# Patient Record
Sex: MALE | Race: WHITE | NOT HISPANIC OR LATINO | Employment: FULL TIME | URBAN - METROPOLITAN AREA
[De-identification: names, ages, dates, MRNs, and addresses within clinical notes are randomized per-mention and may not be internally consistent; named-entity substitution may affect disease eponyms.]

---

## 2017-08-11 ENCOUNTER — DOCUMENTATION ONLY (OUTPATIENT)
Dept: OTHER | Facility: CLINIC | Age: 57
End: 2017-08-11

## 2017-08-11 ENCOUNTER — OFFICE VISIT (OUTPATIENT)
Dept: FAMILY MEDICINE | Facility: CLINIC | Age: 57
End: 2017-08-11
Payer: COMMERCIAL

## 2017-08-11 VITALS
DIASTOLIC BLOOD PRESSURE: 68 MMHG | BODY MASS INDEX: 24.71 KG/M2 | HEART RATE: 80 BPM | OXYGEN SATURATION: 96 % | SYSTOLIC BLOOD PRESSURE: 96 MMHG | HEIGHT: 68 IN | TEMPERATURE: 97.3 F | WEIGHT: 163 LBS

## 2017-08-11 DIAGNOSIS — I83.90 VARICOSE VEIN OF LEG: ICD-10-CM

## 2017-08-11 DIAGNOSIS — Z80.42 FAMILY HX OF PROSTATE CANCER: ICD-10-CM

## 2017-08-11 DIAGNOSIS — N49.2 SCROTAL NODULE: ICD-10-CM

## 2017-08-11 DIAGNOSIS — Z11.59 NEED FOR HEPATITIS C SCREENING TEST: ICD-10-CM

## 2017-08-11 DIAGNOSIS — Z12.11 SPECIAL SCREENING FOR MALIGNANT NEOPLASMS, COLON: ICD-10-CM

## 2017-08-11 DIAGNOSIS — Z00.01 ENCOUNTER FOR ROUTINE ADULT HEALTH EXAMINATION WITH ABNORMAL FINDINGS: Primary | ICD-10-CM

## 2017-08-11 DIAGNOSIS — Z13.6 CARDIOVASCULAR SCREENING; LDL GOAL LESS THAN 160: ICD-10-CM

## 2017-08-11 PROCEDURE — 99396 PREV VISIT EST AGE 40-64: CPT | Performed by: PHYSICIAN ASSISTANT

## 2017-08-11 PROCEDURE — 86803 HEPATITIS C AB TEST: CPT | Performed by: PHYSICIAN ASSISTANT

## 2017-08-11 PROCEDURE — 36415 COLL VENOUS BLD VENIPUNCTURE: CPT | Performed by: PHYSICIAN ASSISTANT

## 2017-08-11 NOTE — MR AVS SNAPSHOT
After Visit Summary   8/11/2017    Duran Garner    MRN: [id number]           Patient Information     Date Of Birth          1960        Visit Information        Provider Department      8/11/2017 2:00 PM Usama Interiano [facility]C [facility]        Today's Diagnoses     Encounter for routine adult health examination with abnormal findings    -  1    CARDIOVASCULAR SCREENING; LDL GOAL LESS THAN 160        Family hx of prostate cancer        Varicose vein of leg        Special screening for malignant neoplasms, colon        Need for hepatitis C screening test        Scrotal nodule          Care Instructions      Preventive Health Recommendations  Male Ages 50 - 64    Yearly exam:             See your health care provider every year in order to  o   Review health changes.   o   Discuss preventive care.    o   Review your medicines if your doctor has prescribed any.     Have a cholesterol test every 5 years, or more frequently if you are at risk for high cholesterol/heart disease.     Have a diabetes test (fasting glucose) every three years. If you are at risk for diabetes, you should have this test more often.     Have a colonoscopy at age 50, or have a yearly FIT test (stool test). These exams will check for colon cancer.      Talk with your health care provider about whether or not a prostate cancer screening test (PSA) is right for you.    You should be tested each year for STDs (sexually transmitted diseases), if you re at risk.     Shots: Get a flu shot each year. Get a tetanus shot every 10 years.     Nutrition:    Eat at least 5 servings of fruits and vegetables daily.     Eat whole-grain bread, whole-wheat pasta and brown rice instead of white grains and rice.     Talk to your provider about Calcium and Vitamin D.     Lifestyle    Exercise for at least 150 minutes a week (30 minutes a day, 5 days a week). This will help you control your weight and prevent disease.      Limit alcohol to one drink per day.     No smoking.     Wear sunscreen to prevent skin cancer.     See your dentist every six months for an exam and cleaning.     See your eye doctor every 1 to 2 years.      You can call [phone] to schedule your imaging at [facility].             Follow-ups after your visit        Additional Services     VASCULAR REFERRAL       Your provider has referred you to the [facility].    Reason for Referral: varicose veins    Urgency of Referral: routine    Please be aware that coverage of these services is subject to the terms and limitations of your health insurance plan.  Call member services at your health plan with any benefit or coverage questions.      Please bring the following with you to your appointment:  (1) Any X-Rays, CTs or MRIs which have been performed.  Contact the facility where they were done to arrange for  prior to your scheduled appointment.  Any new CT, MRI or other procedures ordered by your specialist must be performed at a [place] facility or coordinated by your clinic's referral office.    (2) List of current medications   (3) This referral request   (4) Any documents/labs given to you for this referral                  Future tests that were ordered for you today     Open Future Orders        Priority Expected Expires Ordered    US Testicular and Scrotum Routine  8/11/2018 8/11/2017    Fecal colorectal cancer screen FIT Routine 9/1/2017 11/3/2017 8/11/2017            Who to contact     If you have questions or need follow up information about today's clinic visit or your schedule please contact [facility] directly at [phone].  Normal or non-critical lab and imaging results will be communicated to you by MyChart, letter or phone within 4 business days after the clinic has received the results. If you do not hear from us within 7 days, please contact the clinic through MyChart or phone. If you  "have a critical or abnormal lab result, we will notify you by phone as soon as possible.  Submit refill requests through [employer] or call your pharmacy and they will forward the refill request to us. Please allow 3 business days for your refill to be completed.          Additional Information About Your Visit        [employer] Information     [employer] gives you secure access to your electronic health record. If you see a primary care provider, you can also send messages to your care team and make appointments. If you have questions, please call your primary care clinic.  If you do not have a primary care provider, please call [phone] and they will assist you.        Care EveryWhere ID     This is your Care EveryWhere ID. This could be used by other organizations to access your [place] medical records  [id number]        Your Vitals Were     Pulse Temperature Height Pulse Oximetry BMI (Body Mass Index)       80 97.3  F (36.3  C) (Oral) 5' 8\" (1.727 m) 96% 24.78 kg/m2        Blood Pressure from Last 3 Encounters:   08/11/17 96/68   11/20/15 112/72   11/18/15 128/74    Weight from Last 3 Encounters:   08/11/17 163 lb (73.9 kg)   11/20/15 175 lb (79.4 kg)   11/18/15 175 lb (79.4 kg)              We Performed the Following     Hepatitis C Screen Reflex to HCV RNA Quant and Genotype     VASCULAR REFERRAL        Primary Care Provider Office Phone # Fax #    Usama Interiano PA-C [phone] [address] [facility] [address]        Equal Access to Services     [facility] SONNY : Hadii aad ku hadasho Soomaali, waaxda luqadaha, qaybta kaalmada adeegyada, guy henry . So [facility] [phone].    ATENCIÓN: Si habla español, tiene a albert disposición servicios gratuitos de asistencia lingüística. Llame al [phone].    We comply with applicable federal civil rights laws and [place] laws. We do not discriminate on the basis of race, color, national origin, age, disability " sex, sexual orientation or gender identity.            Thank you!     Thank you for choosing [facility]  for your care. Our goal is always to provide you with excellent care. Hearing back from our patients is one way we can continue to improve our services. Please take a few minutes to complete the written survey that you may receive in the mail after your visit with us. Thank you!             Your Updated Medication List - Protect others around you: Learn how to safely use, store and throw away your medicines at www.disposemymeds.org.          This list is accurate as of: 8/11/17  2:52 PM.  Always use your most recent med list.                   Brand Name Dispense Instructions for use Diagnosis    NO ACTIVE MEDICATIONS

## 2017-08-11 NOTE — PROGRESS NOTES
SUBJECTIVE:   CC: Duran Garner is an 57 year old male who presents for preventative health visit.     Physical   Annual:     Getting at least 3 servings of Calcium per day::  Yes    Bi-annual eye exam::  Yes    Dental care twice a year::  NO    Sleep apnea or symptoms of sleep apnea::  None    Diet::  Regular (no restrictions)    Frequency of exercise::  4-5 days/week    Duration of exercise::  Greater than 60 minutes    Taking medications regularly::  Yes    Medication side effects::  Not applicable    Additional concerns today::  YES        Veins      Duration: several years    Description (location/character/radiation): bilateral calves     Intensity:  moderate    Accompanying signs and symptoms: pain, bulge out     History (similar episodes/previous evaluation): None    Precipitating or alleviating factors: None    Therapies tried and outcome: None     Patient notes a chronic hx of varicose veins  They are irritating, especially after working out or time on feet  Seem to be worsening    Today's PHQ-2 Score: PHQ-2 ( 1999 Pf[employer]) 8/11/2017   Q1: Little interest or pleasure in doing things 0   Q2: Feeling down, depressed or hopeless 0   PHQ-2 Score 0   Q1: Little interest or pleasure in doing things Not at all   Q2: Feeling down, depressed or hopeless Not at all   PHQ-2 Score 0       Abuse: Current or Past(Physical, Sexual or Emotional)- No  Do you feel safe in your environment - Yes    Social History   Substance Use Topics     Smoking status: Never Smoker     Smokeless tobacco: Never Used     Alcohol use 0.0 - 0.5 oz/week     0 - 1 Standard drinks or equivalent per week     The patient does not drink >3 drinks per day nor >7 drinks per week.    Last PSA:   PSA   Date Value Ref Range Status   11/16/2015 0.49 0 - 4 ug/L Final       Reviewed orders with patient. Reviewed health maintenance and updated orders accordingly - Yes  Labs reviewed in EPIC    Reviewed and updated as needed this visit by clinical staff      "    Reviewed and updated as needed this visit by Provider              ROS:  C: NEGATIVE for fever, chills, change in weight  INTEGUMENTARY/SKIN: varicose veins  E: NEGATIVE for vision changes or irritation  ENT: NEGATIVE for ear, mouth and throat problems  R: NEGATIVE for significant cough or SOB  CV: NEGATIVE for chest pain, palpitations or peripheral edema  GI: NEGATIVE for nausea, abdominal pain, heartburn, or change in bowel habits   male: negative for dysuria, hematuria, decreased urinary stream, erectile dysfunction, urethral discharge  M: NEGATIVE for significant arthralgias or myalgia  N: NEGATIVE for weakness, dizziness or paresthesias  P: NEGATIVE for changes in mood or affect    OBJECTIVE:   BP 96/68 (BP Location: Right arm, Cuff Size: Adult Regular)  Pulse 80  Temp 97.3  F (36.3  C) (Oral)  Ht 5' 8\" (1.727 m)  Wt 163 lb (73.9 kg)  SpO2 96%  BMI 24.78 kg/m2    EXAM:  GENERAL: healthy, alert and no distress  EYES: Eyes grossly normal to inspection, PERRL and conjunctivae and sclerae normal  HENT: ear canals and TM's normal, nose and mouth without ulcers or lesions  RESP: lungs clear to auscultation - no rales, rhonchi or wheezes  CV: regular rate and rhythm, normal S1 S2, no S3 or S4, no murmur, click or rub, no peripheral edema and peripheral pulses strong  ABDOMEN: soft, nontender, no hepatosplenomegaly, no masses and bowel sounds normal   (male): testicular nodule along the left testicle, no hernias and penis normal without urethral discharge  MS: varicose veins bilaterally, large  PSYCH: mentation appears normal, affect normal/bright    ASSESSMENT/PLAN:   1. Encounter for routine adult health examination with abnormal findings  56yo male in stable health. Does [employer] triathlons. Report annual screenings thru work as a .     2. CARDIOVASCULAR SCREENING; LDL GOAL LESS THAN 160  Patient updates labs routinely thru work. Will drop these off    3. Family hx of prostate cancer  PSA in 2015 " "normal. No new symptoms. Monitor and consider recheck next year    4. Varicose vein of leg  - VASCULAR REFERRAL    5. Special screening for malignant neoplasms, colon  - Fecal colorectal cancer screen FIT; Future    6. Need for hepatitis C screening test  - Hepatitis C Screen Reflex to HCV RNA Quant and Genotype    7. Scrotal nodule  Reports this has been there for sometime. It is quite ttp.   - US Testicular and Scrotum; Future    COUNSELING:   Reviewed preventive health counseling, as reflected in patient instructions       Regular exercise       Healthy diet/nutrition       Colon cancer screening       Prostate cancer screening       reports that he has never smoked. He has never used smokeless tobacco.      Estimated body mass index is 24.78 kg/(m^2) as calculated from the following:    Height as of this encounter: 5' 8\" (1.727 m).    Weight as of this encounter: 163 lb (73.9 kg).       Counseling Resources:  ATP IV Guidelines  Pooled Cohorts Equation Calculator  FRAX Risk Assessment  ICSI Preventive Guidelines  Dietary Guidelines for Americans, 2010  USDA's MyPlate  ASA Prophylaxis  Lung CA Screening    Usama Interiano PA-C  [facility] JINNYMOUNTAnswers for HPI/ROS submitted by the patient on 8/11/2017   PHQ-2 Score: 0    "

## 2017-08-11 NOTE — PATIENT INSTRUCTIONS
Preventive Health Recommendations  Male Ages 50 - 64    Yearly exam:             See your health care provider every year in order to  o   Review health changes.   o   Discuss preventive care.    o   Review your medicines if your doctor has prescribed any.     Have a cholesterol test every 5 years, or more frequently if you are at risk for high cholesterol/heart disease.     Have a diabetes test (fasting glucose) every three years. If you are at risk for diabetes, you should have this test more often.     Have a colonoscopy at age 50, or have a yearly FIT test (stool test). These exams will check for colon cancer.      Talk with your health care provider about whether or not a prostate cancer screening test (PSA) is right for you.    You should be tested each year for STDs (sexually transmitted diseases), if you re at risk.     Shots: Get a flu shot each year. Get a tetanus shot every 10 years.     Nutrition:    Eat at least 5 servings of fruits and vegetables daily.     Eat whole-grain bread, whole-wheat pasta and brown rice instead of white grains and rice.     Talk to your provider about Calcium and Vitamin D.     Lifestyle    Exercise for at least 150 minutes a week (30 minutes a day, 5 days a week). This will help you control your weight and prevent disease.     Limit alcohol to one drink per day.     No smoking.     Wear sunscreen to prevent skin cancer.     See your dentist every six months for an exam and cleaning.     See your eye doctor every 1 to 2 years.      You can call [phone] to schedule your imaging at [facility].

## 2017-08-11 NOTE — PROGRESS NOTES
Received referral via In Basket. Per Vascular [facility]ce[facility] Service guidelines, forwarded to [facility].     Alysa Mckeon RN, BSN.

## 2017-08-14 LAB — HCV AB SERPL QL IA: NORMAL

## 2017-08-23 ENCOUNTER — HOSPITAL ENCOUNTER (OUTPATIENT)
Dept: ULTRASOUND IMAGING | Facility: CLINIC | Age: 57
Discharge: HOME | End: 2017-08-23
Attending: PHYSICIAN ASSISTANT | Admitting: PHYSICIAN ASSISTANT
Payer: COMMERCIAL

## 2017-08-23 DIAGNOSIS — N49.2 SCROTAL NODULE: ICD-10-CM

## 2017-08-23 PROCEDURE — 93976 VASCULAR STUDY: CPT

## 2017-09-15 ENCOUNTER — OFFICE VISIT (OUTPATIENT)
Dept: VASCULAR SURGERY | Facility: CLINIC | Age: 57
End: 2017-09-15
Payer: COMMERCIAL

## 2017-09-15 ENCOUNTER — APPOINTMENT (OUTPATIENT)
Dept: VASCULAR SURGERY | Facility: CLINIC | Age: 57
End: 2017-09-15
Payer: COMMERCIAL

## 2017-09-15 DIAGNOSIS — I83.811 VARICOSE VEINS OF RIGHT LOWER EXTREMITIES WITH PAIN: Primary | ICD-10-CM

## 2017-09-15 PROCEDURE — 99207 ZZC VEINSOLUTIONS FREE SCREENING: CPT | Performed by: SURGERY

## 2017-09-15 PROCEDURE — 99203 OFFICE O/P NEW LOW 30 MIN: CPT | Performed by: SURGERY

## 2017-09-15 PROCEDURE — 93971 EXTREMITY STUDY: CPT | Performed by: SURGERY

## 2017-09-15 ASSESSMENT — ENCOUNTER SYMPTOMS
CARDIOVASCULAR NEGATIVE: 1
PSYCHIATRIC NEGATIVE: 1
RESPIRATORY NEGATIVE: 1
MUSCULOSKELETAL NEGATIVE: 1

## 2017-09-15 NOTE — LETTER
[facility] [place] [address]  [place] [address]  Phone: [phone]  Fax: [phone]          September 15, 2017      RE:  Duran Garner-:  64    CC: Right lower leg pain with varicose veins.     HPI   Mr. Garner is a pleasant 57-year-old male who [facility] for evaluation of his varicose veins in his right leg than the causing him pain. He says the varicose means present for approximately 5 years however within the last one year he has noticed increasing discomfort especially after prolonged standing. As well as several episodes of phlebitis varicose means. He describes us as tender areas that feel like knots and become palpable cords. 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 He denies a previous history of DVT, no clotting history, no family history of clot formation,  no previous history of DVT varicose pains or limb swelling, and no previous history of trauma. He also denies history of neoplasm or cancer. He has no unexplained recent weight loss. Patient is interested in dealing with the varicose veins right lower extremity because of the discomfort that causes him.      Review of Systems   Respiratory: Negative.    Cardiovascular: Negative.    Musculoskeletal: Negative.    Skin: Negative.    Psychiatric/Behavioral: Negative.       Works as a  and compete in triathlons.     Family History:  Negative and no history of DVT/clotting disorder.      Physical Exam  Vitals reviewed.   Gen: NAD, alert and oriented x3  Chest:  Normal respiratory effort and chest rise.   Abd: soft, NT, ND  Ext: warm and well perfused.  Palpable pedal pulses noted bilaterally.  Right calf is larger than the left by comparison.  He has some evidence of phlebitis on examination with areas of tenderness and palpable cords along his varicosities.  Significant number of right lower leg varicose veins and extending up along the  medial thigh.  No skin changes noted.  Left leg with small varicose vein present at just behind the knee, and another above the medial aspect of the ankle.  Bilateral groin is normal with no varicosities noted.          Imaging:       Venous duplex showed no DVT. It did show evidence of incompetence of the entire GSV on the right.  There is a small segment of occlusive clot just below the knee on the right and small area of non-occlusive clot in the GSV on the right as well.   The GSV is deeper than 5 mm throughout the thigh expect in the mid/distal thigh it is 2.5 and 2.7 mm in depth.  There are no significant perforators.  Numerous varicosities throughout the lower leg below the knee.  SSV is competent.      Assessment: 58 yo male with painful varicose veins of the right lower leg, C2, with incompetence of the GSV.      Plan:   It was discussed with Mr. Garner that his GSV was incompetent and that there was phlebitis within some of his varicose veins in his right lower extremity. He currently wears compression hose regularly over the last year that is knee-high. He is an avid cyclist and competes in triathlons as well as a . Based on also findings the GSV is incompetent throughout the thigh with clot in the proximal GSV in the calf. The depth GSV varies and is extremely shallow in the mid to distal thigh 2.5 mm in depth. Based on these findings it would appear that radiofrequency ablation cannot be possible due to the superficial nature of the GSV in the mid-distal thigh. I recommended that the patient undergo venous closure with Clarivein technique based on how superficial GSV. The patient will check with the FAA and confirm that he can have a procedure done as well as insurance approval. The plan will be venous closure with Clarivein with stab phlebectomy. The patient will need pictures taken as well as measurement for thigh high compression hose of 20-30 mm Mercury    Sj Frazier MD

## 2017-09-15 NOTE — MR AVS SNAPSHOT
After Visit Summary   9/15/2017    Duran Garner    MRN: [id number]           Patient Information     Date Of Birth          1960        Visit Information        Provider Department      9/15/2017 11:30 AM Sj Frazier MD [facility]      Today's Diagnoses     Varicose veins of right lower extremities with pain    -  1       Follow-ups after your visit        Who to contact     If you have questions or need follow up information about today's clinic visit or your schedule please contact [facility] directly at [phone].  Normal or non-critical lab and imaging results will be communicated to you by MyChart, letter or phone within 4 business days after the clinic has received the results. If you do not hear from us within 7 days, please contact the clinic through [employer] or phone. If you have a critical or abnormal lab result, we will notify you by phone as soon as possible.  Submit refill requests through [employer] or call your pharmacy and they will forward the refill request to us. Please allow 3 business days for your refill to be completed.          Additional Information About Your Visit        MyChart Information     [employer] gives you secure access to your electronic health record. If you see a primary care provider, you can also send messages to your care team and make appointments. If you have questions, please call your primary care clinic.  If you do not have a primary care provider, please call [phone] and they will assist you.        Care EveryWhere ID     This is your Care EveryWhere ID. This could be used by other organizations to access your [place] medical records  [id number]         Blood Pressure from Last 3 Encounters:   08/11/17 96/68   11/20/15 112/72   11/18/15 128/74    Weight from Last 3 Encounters:   08/11/17 163 lb (73.9 kg)   11/20/15 175 lb (79.4 kg)   11/18/15 175 lb  (79.4 kg)              Today, you had the following     No orders found for display       Primary Care Provider Office Phone # Fax #    Usama Interiano PA-C [phone] [address] [facility] [address]        Equal Access to Services     HENRY DENNIS : Hadii aad ku hadasho Soomaali, waaxda luqadaha, qaybta kaalmada adeegyada, waxay idiin hayaan adeeg kharash la'morenon . So [facility] [phone].    ATENCIÓN: Si habla español, tiene a albert disposición servicios gratuitos de asistencia lingüística. Llame al [phone].    We comply with applicable federal civil rights laws and [place] laws. We do not discriminate on the basis of race, color, national origin, age, disability sex, sexual orientation or gender identity.            Thank you!     Thank you for choosing [facility]  for your care. Our goal is always to provide you with excellent care. Hearing back from our patients is one way we can continue to improve our services. Please take a few minutes to complete the written survey that you may receive in the mail after your visit with us. Thank you!             Your Updated Medication List - Protect others around you: Learn how to safely use, store and throw away your medicines at www.disposemymeds.org.          This list is accurate as of: 9/15/17 12:01 PM.  Always use your most recent med list.                   Brand Name Dispense Instructions for use Diagnosis    NO ACTIVE MEDICATIONS

## 2017-09-25 ENCOUNTER — DOCUMENTATION ONLY (OUTPATIENT)
Dept: FAMILY MEDICINE | Facility: CLINIC | Age: 57
End: 2017-09-25

## 2017-09-25 NOTE — PROGRESS NOTES
Panel Management Review      Patient has the following on his problem list: None      Composite cancer screening  Chart review shows that this patient is due/due soon for the following Colonoscopy  Summary:    Patient is due/failing the following:   COLONOSCOPY    Action needed:   Patient needs office visit for colon.    Type of outreach:    Sent [employer] [facility] message.    Questions for provider review:    None                                                                                                                                    Tr [facility]       Chart routed to Care Team .

## 2018-01-18 PROCEDURE — 82274 ASSAY TEST FOR BLOOD FECAL: CPT | Performed by: PHYSICIAN ASSISTANT

## 2018-01-20 LAB — HEMOCCULT STL QL IA: NEGATIVE

## 2018-01-22 DIAGNOSIS — Z12.11 SPECIAL SCREENING FOR MALIGNANT NEOPLASMS, COLON: ICD-10-CM

## 2018-06-21 ENCOUNTER — OFFICE VISIT (OUTPATIENT)
Dept: FAMILY MEDICINE | Facility: CLINIC | Age: 58
End: 2018-06-21
Payer: COMMERCIAL

## 2018-06-21 VITALS
TEMPERATURE: 97.1 F | SYSTOLIC BLOOD PRESSURE: 120 MMHG | HEART RATE: 69 BPM | OXYGEN SATURATION: 96 % | RESPIRATION RATE: 18 BRPM | DIASTOLIC BLOOD PRESSURE: 72 MMHG | HEIGHT: 68 IN

## 2018-06-21 DIAGNOSIS — Z01.818 PREOP GENERAL PHYSICAL EXAM: Primary | ICD-10-CM

## 2018-06-21 DIAGNOSIS — S82.301A CLOSED FRACTURE OF DISTAL END OF RIGHT TIBIA, UNSPECIFIED FRACTURE MORPHOLOGY, INITIAL ENCOUNTER: ICD-10-CM

## 2018-06-21 LAB — HGB BLD-MCNC: 15.1 G/DL (ref 13.3–17.7)

## 2018-06-21 PROCEDURE — 36415 COLL VENOUS BLD VENIPUNCTURE: CPT | Performed by: PHYSICIAN ASSISTANT

## 2018-06-21 PROCEDURE — 85018 HEMOGLOBIN: CPT | Performed by: PHYSICIAN ASSISTANT

## 2018-06-21 PROCEDURE — 99214 OFFICE O/P EST MOD 30 MIN: CPT | Performed by: PHYSICIAN ASSISTANT
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
## 2018-06-21 NOTE — MR AVS SNAPSHOT
After Visit Summary   6/21/2018    Duran Garner    MRN: [id number]           Patient Information     Date Of Birth          1960        Visit Information        Provider Department      6/21/2018 1:20 PM Usama Interiano PA-C [facility]        Today's Diagnoses     Preop general physical exam    -  1    Closed fracture of distal end of right tibia, unspecified fracture morphology, initial encounter          Care Instructions      Before Your Surgery      Call your surgeon if there is any change in your health. This includes signs of a cold or flu (such as a sore throat, runny nose, cough, rash or fever).    Do not smoke, drink alcohol or take over the counter medicine (unless your surgeon or primary care doctor tells you to) for the 24 hours before and after surgery.    If you take prescribed drugs: Follow your doctor s orders about which medicines to take and which to stop until after surgery.    Eating and drinking prior to surgery: follow the instructions from your surgeon    Take a shower or bath the night before surgery. Use the soap your surgeon gave you to gently clean your skin. If you do not have soap from your surgeon, use your regular soap. Do not shave or scrub the surgery site.  Wear clean pajamas and have clean sheets on your bed.           Follow-ups after your visit        Follow-up notes from your care team     Return in about 3 months (around 9/21/2018) for Physical Exam.      Who to contact     If you have questions or need follow up information about today's clinic visit or your schedule please contact [facility] directly at [phone].  Normal or non-critical lab and imaging results will be communicated to you by MyChart, letter or phone within 4 business days after the clinic has received the results. If you do not hear from us within 7 days, please contact the clinic through MyChart or phone. If you have a critical or abnormal lab  "result, we will notify you by phone as soon as possible.  Submit refill requests through [employer] or call your pharmacy and they will forward the refill request to us. Please allow 3 business days for your refill to be completed.          Additional Information About Your Visit        Backpackhart Information     [employer] gives you secure access to your electronic health record. If you see a primary care provider, you can also send messages to your care team and make appointments. If you have questions, please call your primary care clinic.  If you do not have a primary care provider, please call [phone] and they will assist you.        Care EveryWhere ID     This is your Care EveryWhere ID. This could be used by other organizations to access your [facility] medical records  [id number]        Your Vitals Were     Pulse Temperature Respirations Height Pulse Oximetry       69 97.1  F (36.2  C) (Tympanic) 18 5' 8\" (1.727 m) 96%        Blood Pressure from Last 3 Encounters:   06/21/18 120/72   08/11/17 96/68   11/20/15 112/72    Weight from Last 3 Encounters:   08/11/17 163 lb (73.9 kg)   11/20/15 175 lb (79.4 kg)   11/18/15 175 lb (79.4 kg)              We Performed the Following     Hemoglobin        Primary Care Provider Office Phone # Fax #    Usama Interiano PA-C [phone] [address] [facility] [address]        Equal Access to Services     [facility] Hospital[facility] AH: Hadii aad ku hadasho Soomaali, waaxda luqadaha, qaybta kaalmada adeegyada, guy henry . So [facility] [phone].    ATENCIÓN: Si habla español, tiene a albert disposición servicios gratuitos de asistencia lingüística. Llame al [phone].    We comply with applicable federal civil rights laws and [place] laws. We do not discriminate on the basis of race, color, national origin, age, disability, sex, sexual orientation, or gender identity.            Thank you!     Thank you for choosing [facility] " [facility]  for your care. Our goal is always to provide you with excellent care. Hearing back from our patients is one way we can continue to improve our services. Please take a few minutes to complete the written survey that you may receive in the mail after your visit with us. Thank you!             Your Updated Medication List - Protect others around you: Learn how to safely use, store and throw away your medicines at www.disposemymeds.org.          This list is accurate as of 6/21/18  1:55 PM.  Always use your most recent med list.                   Brand Name Dispense Instructions for use Diagnosis    NO ACTIVE MEDICATIONS

## 2018-07-05 ENCOUNTER — THERAPY VISIT (OUTPATIENT)
Dept: PHYSICAL THERAPY | Facility: CLINIC | Age: 58
End: 2018-07-05
Payer: COMMERCIAL

## 2018-07-05 DIAGNOSIS — Z47.89 AFTERCARE FOLLOWING SURGERY OF THE MUSCULOSKELETAL SYSTEM: Primary | ICD-10-CM

## 2018-07-05 DIAGNOSIS — M25.571 PAIN IN JOINT, ANKLE AND FOOT, RIGHT: ICD-10-CM

## 2018-07-05 PROBLEM — M25.572 PAIN IN JOINT, ANKLE AND FOOT, LEFT: Status: ACTIVE | Noted: 2018-07-05

## 2018-07-05 PROCEDURE — 97116 GAIT TRAINING THERAPY: CPT | Mod: GP | Performed by: PHYSICAL THERAPIST

## 2018-07-05 PROCEDURE — 97161 PT EVAL LOW COMPLEX 20 MIN: CPT | Mod: GP | Performed by: PHYSICAL THERAPIST

## 2018-07-05 NOTE — PROGRESS NOTES
"[facility] Initial Evaluation  Subjective:  Patient is a 58 year old male presenting with rehab right ankle/foot hpi.   Duran Garner is a 58 year old male with a right ankle condition.  Condition occurred with:  A twist (soccer, hit while spraining rt ankle and fractured rt fibula.).  Condition occurred: during recreation/sport.  This is a new condition  DOI 6/17/18.  DOS 6/22/2018.  Pt. Wearing ankle boot FT and is WBAT.  Wore the boot FT for 10 days until initial recheck.  Able to remove boot for exercise and if NWB. Hx of 2-3 ankle sprains.  .    Patient reports pain:  Lateral (Also compains of pain rt ant tibia, concerns about swelling anterior rt shin.  ).    Pain is described as aching and is intermittent (pain in rt shin is worse with walking.  ) and reported as 2/10.   Pain is the same all the time.  Symptoms are exacerbated by walking (Having difficulty with WBAT  \"not putting much weight down.\") and relieved by rest.          General health as reported by patient is excellent.  Pertinent medical history includes:  Numbness/tingling (top of right foot).      Current medications:  Pain medication (Tylenol).                                      Objective:    Gait:  Walking boot fixed.  Weight Bearing Status:  WBAT   Assistive Devices:  Crutches            Ankle/Foot Evaluation  ROM:  Arom ankle eval: Gentle AROM for Inv/Ever to see if patient can initiate contraction and do the movement s substitution.  HEP c 25% for these movements.PROM is not assessed.  AROM:    Dorsiflexion:  Left:   12  Right:   0  Plantarflexion:  Left:  35    Right:  18  Inversion:  Left:  20     Right:  5  Eversion:  7     Right:  3        Strength is not assessed.        EDEMA: Edema ankle: circummferential rt 1\" larger.                                                              General     ROS    Assessment/Plan:    Patient is a 58 year old male with right side ankle complaints.    Patient has the following " significant findings with corresponding treatment plan.                Diagnosis 1:  Rt fibular fx, ORIF  Pain -  hot/cold therapy, manual therapy and self management  Decreased ROM/flexibility - manual therapy, therapeutic exercise and home program  Decreased joint mobility - manual therapy, therapeutic exercise and home program  Decreased strength - therapeutic exercise, therapeutic activities and home program  Decreased proprioception - neuro re-education and therapeutic activities    Therapy Evaluation Codes:   1) History comprised of:   Personal factors that impact the plan of care:      None.    Comorbidity factors that impact the plan of care are:      Numbness/tingling.     Medications impacting care: Pain.  2) Examination of Body Systems comprised of:   Body structures and functions that impact the plan of care:      Ankle.   Activity limitations that impact the plan of care are:      Bending, Driving, Jumping, Running, Sitting, Squatting/kneeling, Stairs, Standing and Walking.  3) Clinical presentation characteristics are:   Stable/Uncomplicated.  4) Decision-Making    Low complexity using standardized patient assessment instrument and/or measureable assessment of functional outcome.  Cumulative Therapy Evaluation is: Low complexity.    Previous and current functional limitations:  (See Goal Flow Sheet for this information)    Short term and Long term goals: (See Goal Flow Sheet for this information)     Communication ability:  Patient appears to be able to clearly communicate and understand verbal and written communication and follow directions correctly.  Treatment Explanation - The following has been discussed with the patient:   RX ordered/plan of care  Anticipated outcomes  Possible risks and side effects  This patient would benefit from PT intervention to resume normal activities.   Rehab potential is good.    Frequency:  2 X week, once daily  Duration:  for 2 weeks tapering to 1 X a week over 8  weeks  Discharge Plan:  Achieve all LTG.  Independent in home treatment program.  Reach maximal therapeutic benefit.    Please refer to the daily flowsheet for treatment today, total treatment time and time spent performing 1:1 timed codes.

## 2018-07-05 NOTE — MR AVS SNAPSHOT
After Visit Summary   7/5/2018    Duran Garner    MRN: [id number]           Patient Information     Date Of Birth          1960        Visit Information        Provider Department      7/5/2018 12:50 PM Paolo Gilbert PT [facility] [place]        Today's Diagnoses     Aftercare following surgery of the musculoskeletal system    -  1    Pain in joint, ankle and foot, right           Follow-ups after your visit        Your next 10 appointments already scheduled     Jul 11, 2018  1:30 PM CDT   SADE Extremity with Paolo Gilbert PT   [facility] ([facility] [place])    [address]  [place] [address]   [phone]            Jul 13, 2018 11:30 AM CDT   SADE Extremity with Paolo Gilbert PT   [facility] ([facility] [place])    [address]  [place] [id number] [phone]              Who to contact     If you have questions or need follow up information about today's clinic visit or your schedule please contact [facility] directly at [phone].  Normal or non-critical lab and imaging results will be communicated to you by MyChart, letter or phone within 4 business days after the clinic has received the results. If you do not hear from us within 7 days, please contact the clinic through MyChart or phone. If you have a critical or abnormal lab result, we will notify you by phone as soon as possible.  Submit refill requests through [employer] or call your pharmacy and they will forward the refill request to us. Please allow 3 business days for your refill to be completed.          Additional Information About Your Visit        MyChart Information     [employer] gives you secure access to your electronic health record. If you see a primary care provider, you can also send messages to your care team and make appointments. If you have questions, please  call your primary care clinic.  If you do not have a primary care provider, please call [phone] and they will assist you.        Care EveryWhere ID     This is your Care EveryWhere ID. This could be used by other organizations to access your [facility] medical records  [id number]         Blood Pressure from Last 3 Encounters:   06/21/18 120/72   08/11/17 96/68   11/20/15 112/72    Weight from Last 3 Encounters:   08/11/17 73.9 kg (163 lb)   11/20/15 79.4 kg (175 lb)   11/18/15 79.4 kg (175 lb)              We Performed the Following     GAIT TRAINING THERAPY     HC PT EVAL, LOW COMPLEXITY     SADE INITIAL EVAL REPORT        Primary Care Provider Office Phone # Fax #    Usama Interiano PA-C [phone] [address] [place]  [facility] [address]        Equal Access to Services     HENRY DENNIS : Hadii aad ku hadasho Soomaali, waaxda luqadaha, qaybta kaalmada adeegyada, waxay idiin hayaan adeeg kharash laButchaan . So [facility] [phone].    ATENCIÓN: Si habla español, tiene a albert disposición servicios gratuitos de asistencia lingüística. Stephany al [phone].    We comply with applicable federal civil rights laws and [place] laws. We do not discriminate on the basis of race, color, national origin, age, disability, sex, sexual orientation, or gender identity.            Thank you!     Thank you for choosing [facility]  for your [facility]. Our goal is always to provide you with excellent care. Hearing back from our patients is one way we can continue to improve our services. Please take a few minutes to complete the written survey that you may receive in the mail after your visit with us. Thank you!             Your Updated Medication List - Protect others around you: Learn how to safely use, store and throw away your medicines at www.disposemymeds.org.          This list is accurate as of 7/5/18  3:19 PM.  Always use your most recent med list.                   Brand  Name Dispense Instructions for use Diagnosis    NO ACTIVE MEDICATIONS

## 2018-07-05 NOTE — LETTER
"[facility]  [address] [place] [address]  [phone]    2018    Re: Duran Garner   :   1960  MRN:  [id number]   REFERRING PHYSICIAN:   Silver Klein    [facility]    Date of Initial Evaluation: 2018  Visits:  Rxs Used: 1  Reason for Referral:     Aftercare following surgery of the musculoskeletal system  Pain in joint, ankle and foot, right    EVALUATION SUMMARY    [facility] Initial Evaluation  Subjective:  Patient is a 58 year old male presenting with rehab right ankle/foot hpi.   Duran Garner is a 58 year old male with a right ankle condition.  Condition occurred with:  A twist (soccer, hit while spraining rt ankle and fractured rt fibula.).  Condition occurred: during recreation/sport.  This is a new condition  DOI 18.  DOS 2018.  Pt. Wearing ankle boot FT and is WBAT.  Wore the boot FT for 10 days until initial recheck.  Able to remove boot for exercise and if NWB. Hx of 2-3 ankle sprains.  .    Patient reports pain:  Lateral (Also compains of pain rt ant tibia, concerns about swelling anterior rt shin.  ).    Pain is described as aching and is intermittent (pain in rt shin is worse with walking.  ) and reported as 2/10.   Pain is the same all the time.  Symptoms are exacerbated by walking (Having difficulty with WBAT  \"not putting much weight down.\") and relieved by rest.          General health as reported by patient is excellent.  Pertinent medical history includes:  Numbness/tingling (top of right foot).      Current medications:  Pain medication (Tylenol).        Objective:  Gait:  Walking boot fixed.  Weight Bearing Status:  WBAT   Assistive Devices:  Crutches  Ankle/Foot Evaluation  ROM:  Arom ankle eval: Gentle AROM for Inv/Ever to see if patient can initiate contraction and do the movement s substitution.  HEP c 25% for these movements.PROM is not " "assessed.  AROM:    Dorsiflexion:  Left:   12  Right:   0  Plantarflexion:  Left:  35    Right:  18  Inversion:  Left:  20     Right:  5  Eversion:  7     Right:  3  Strength is not assessed.  EDEMA: Edema ankle: circummferential rt 1\" larger.  Assessment/Plan:    Patient is a 58 year old male with right side ankle complaints.    Patient has the following significant findings with corresponding treatment plan.                Re: Duran Garner   :   1960  Diagnosis 1:  Rt fibular fx, ORIF  Pain -  hot/cold therapy, manual therapy and self management  Decreased ROM/flexibility - manual therapy, therapeutic exercise and home program  Decreased joint mobility - manual therapy, therapeutic exercise and home program  Decreased strength - therapeutic exercise, therapeutic activities and home program  Decreased proprioception - neuro re-education and therapeutic activities    Therapy Evaluation Codes:   1) History comprised of:   Personal factors that impact the plan of care:      None.    Comorbidity factors that impact the plan of care are:      Numbness/tingling.     Medications impacting care: Pain.  2) Examination of Body Systems comprised of:   Body structures and functions that impact the plan of care:      Ankle.   Activity limitations that impact the plan of care are:      Bending, Driving, Jumping, Running, Sitting, Squatting/kneeling, Stairs, Standing and Walking.  3) Clinical presentation characteristics are:   Stable/Uncomplicated.  4) Decision-Making    Low complexity using standardized patient assessment instrument and/or measureable assessment of functional outcome.  Cumulative Therapy Evaluation is: Low complexity.    Previous and current functional limitations:  (See Goal Flow Sheet for this information)    Short term and Long term goals: (See Goal Flow Sheet for this information)   Communication ability:  Patient appears to be able to clearly communicate and understand verbal and written " communication and follow directions correctly.  Treatment Explanation - The following has been discussed with the patient:   RX ordered/plan of care  Anticipated outcomes  Possible risks and side effects  This patient would benefit from PT intervention to resume normal activities.   Rehab potential is good.  Frequency:  2 X week, once daily  Duration:  for 2 weeks tapering to 1 X a week over 8 weeks  Discharge Plan:  Achieve all LTG.  Independent in home treatment program.  Reach maximal therapeutic benefit.    Thank you for your referral.    INQUIRIES  Therapist: Yon Gilbert PT  [facility]  [address] [facility] [address]  Phone: [phone]  Fax: [phone]

## 2018-07-11 ENCOUNTER — THERAPY VISIT (OUTPATIENT)
Dept: PHYSICAL THERAPY | Facility: CLINIC | Age: 58
End: 2018-07-11
Payer: COMMERCIAL

## 2018-07-11 DIAGNOSIS — M25.571 PAIN IN JOINT, ANKLE AND FOOT, RIGHT: ICD-10-CM

## 2018-07-11 DIAGNOSIS — Z47.89 AFTERCARE FOLLOWING SURGERY OF THE MUSCULOSKELETAL SYSTEM: ICD-10-CM

## 2018-07-11 PROCEDURE — 97110 THERAPEUTIC EXERCISES: CPT | Mod: GP | Performed by: PHYSICAL THERAPIST

## 2018-07-19 ENCOUNTER — THERAPY VISIT (OUTPATIENT)
Dept: PHYSICAL THERAPY | Facility: CLINIC | Age: 58
End: 2018-07-19
Payer: COMMERCIAL

## 2018-07-19 DIAGNOSIS — Z47.89 AFTERCARE FOLLOWING SURGERY OF THE MUSCULOSKELETAL SYSTEM: ICD-10-CM

## 2018-07-19 DIAGNOSIS — M25.571 PAIN IN JOINT, ANKLE AND FOOT, RIGHT: ICD-10-CM

## 2018-07-19 PROCEDURE — 97110 THERAPEUTIC EXERCISES: CPT | Mod: GP | Performed by: PHYSICAL THERAPIST

## 2018-08-01 ENCOUNTER — THERAPY VISIT (OUTPATIENT)
Dept: PHYSICAL THERAPY | Facility: CLINIC | Age: 58
End: 2018-08-01
Payer: COMMERCIAL

## 2018-08-01 DIAGNOSIS — M25.571 PAIN IN JOINT, ANKLE AND FOOT, RIGHT: ICD-10-CM

## 2018-08-01 DIAGNOSIS — Z47.89 AFTERCARE FOLLOWING SURGERY OF THE MUSCULOSKELETAL SYSTEM: ICD-10-CM

## 2018-08-01 PROCEDURE — 97140 MANUAL THERAPY 1/> REGIONS: CPT | Mod: GP | Performed by: PHYSICAL THERAPIST

## 2018-08-01 PROCEDURE — 97110 THERAPEUTIC EXERCISES: CPT | Mod: GP | Performed by: PHYSICAL THERAPIST

## 2018-08-08 ENCOUNTER — THERAPY VISIT (OUTPATIENT)
Dept: PHYSICAL THERAPY | Facility: CLINIC | Age: 58
End: 2018-08-08
Payer: COMMERCIAL

## 2018-08-08 DIAGNOSIS — M25.571 PAIN IN JOINT, ANKLE AND FOOT, RIGHT: ICD-10-CM

## 2018-08-08 DIAGNOSIS — Z47.89 AFTERCARE FOLLOWING SURGERY OF THE MUSCULOSKELETAL SYSTEM: ICD-10-CM

## 2018-08-08 PROCEDURE — 97112 NEUROMUSCULAR REEDUCATION: CPT | Mod: GP | Performed by: PHYSICAL THERAPIST

## 2018-08-08 PROCEDURE — 97110 THERAPEUTIC EXERCISES: CPT | Mod: GP | Performed by: PHYSICAL THERAPIST

## 2018-09-25 ENCOUNTER — THERAPY VISIT (OUTPATIENT)
Dept: PHYSICAL THERAPY | Facility: CLINIC | Age: 58
End: 2018-09-25
Payer: COMMERCIAL

## 2018-09-25 DIAGNOSIS — Z47.89 AFTERCARE FOLLOWING SURGERY OF THE MUSCULOSKELETAL SYSTEM: ICD-10-CM

## 2018-09-25 DIAGNOSIS — M25.571 PAIN IN JOINT, ANKLE AND FOOT, RIGHT: ICD-10-CM

## 2018-09-25 PROCEDURE — 97110 THERAPEUTIC EXERCISES: CPT | Mod: GP | Performed by: PHYSICAL THERAPIST

## 2018-09-25 PROCEDURE — 97112 NEUROMUSCULAR REEDUCATION: CPT | Mod: GP | Performed by: PHYSICAL THERAPIST

## 2018-09-25 NOTE — PROGRESS NOTES
Subjective:  HPI                    Objective:  System    Physical Exam    General     ROS    Assessment/Plan:    PROGRESS  REPORT    Progress reporting period is from Evaluation to 9/25/2018.       SUBJECTIVE  Subjective: pain in am, getting plantar fasciitis sxs.  played golf, 36 holes in 3 days.  Biked 50 miles and swimming.  Have not been able to run.    Current Pain level: 0/10 (in ankle, some heel pain.  Initial Pain level: 2/10.   Changes in function:  Yes, See subjective.  Adverse reaction to treatment or activity: None    OBJECTIVE  Changes noted in objective findings:  Yes, DF, PF, and inversion, eversion close to symmetrical.  DF limited WB'ing in gastroc stretch position, 25 compared to 45 on lt.  This increased 10 degrees after treatment today.  Pt. Able to feel a stretch in post calf after treatment.  Gait close to wnls, but slight toe out on rt.        ASSESSMENT/PLAN  Updated problem list and treatment plan: Diagnosis 1:  Rt ORIF, fibular DOS 6/22/2018  Decreased ROM/flexibility - manual therapy, therapeutic exercise and home program  Decreased joint mobility - manual therapy and home program  Decreased proprioception - neuro re-education and home program  STG/LTGs have been met or progress has been made towards goals:  Yes (See Goal flow sheet completed today.) and Yes, and added or advanced LTG today.  Assessment of Progress: The patient's condition is improving.  Self Management Plans:  Patient has been instructed in a home treatment program.  Patient  has been instructed in self management of symptoms.  I have re-evaluated this patient and find that the nature, scope, duration and intensity of the therapy is appropriate for the medical condition of the patient.  Duran continues to require the following intervention to meet STG and LTG's:  PT    Recommendations:  This patient would benefit from continued therapy.     Frequency:  1 X week, once daily for 1-2 more visits  Duration:  for 4  weeks        Please refer to the daily flowsheet for treatment today, total treatment time and time spent performing 1:1 timed codes.

## 2018-09-25 NOTE — MR AVS SNAPSHOT
After Visit Summary   9/25/2018    Duran Garner    MRN: [id number]           Patient Information     Date Of Birth          1960        Visit Information        Provider Department      9/25/2018 2:50 PM Paolo Gilbert PT [facility]        Today's Diagnoses     Pain in joint, ankle and foot, right        Aftercare following surgery of the musculoskeletal system           Follow-ups after your visit        Who to contact     If you have questions or need follow up information about today's clinic visit or your schedule please contact [facility] directly at [phone].  Normal or non-critical lab and imaging results will be communicated to you by [employer], letter or phone within 4 business days after the clinic has received the results. If you do not hear from us within 7 days, please contact the clinic through [employer] or phone. If you have a critical or abnormal lab result, we will notify you by phone as soon as possible.  Submit refill requests through [employer] or call your pharmacy and they will forward the refill request to us. Please allow 3 business days for your refill to be completed.          Additional Information About Your Visit        MyChart Information     [employer] gives you secure access to your electronic health record. If you see a primary care provider, you can also send messages to your care team and make appointments. If you have questions, please call your primary care clinic.  If you do not have a primary care provider, please call [phone] and they will assist you.        Care EveryWhere ID     This is your Care EveryWhere ID. This could be used by other organizations to access your [place] medical records  [id number]         Blood Pressure from Last 3 Encounters:   06/21/18 120/72   08/11/17 96/68   11/20/15 112/72    Weight from Last 3 Encounters:   08/11/17 73.9 kg (163 lb)   11/20/15 79.4 kg  (175 lb)   11/18/15 79.4 kg (175 lb)              We Performed the Following     NEUROMUSCULAR RE-EDUCATION     THERAPEUTIC EXERCISES        Primary Care Provider Office Phone # Fax #    Usama Interiano PA-C [phone] [address] [facility] [address]        Equal Access to Services     [facility]: Hadii aad ku hadasho Soomaali, waaxda luqadaha, qaybta kaalmada adeegyada, waxay idiin hayaan adeeg kharash la'aan . So [facility] [phone].    ATENCIÓN: Si habla español, tiene a albert disposición servicios gratuitos de asistencia lingüística. Hannaame al [phone].    We comply with applicable federal civil rights laws and [place] laws. We do not discriminate on the basis of race, color, national origin, age, disability, sex, sexual orientation, or gender identity.            Thank you!     Thank you for choosing [facility]   for your care. Our goal is always to provide you with excellent care. Hearing back from our patients is one way we can continue to improve our services. Please take a few minutes to complete the written survey that you may receive in the mail after your visit with us. Thank you!             Your Updated Medication List - Protect others around you: Learn how to safely use, store and throw away your medicines at www.disposemymeds.org.          This list is accurate as of 9/25/18  3:38 PM.  Always use your most recent med list.                   Brand Name Dispense Instructions for use Diagnosis    NO ACTIVE MEDICATIONS

## 2018-10-18 ENCOUNTER — THERAPY VISIT (OUTPATIENT)
Dept: PHYSICAL THERAPY | Facility: CLINIC | Age: 58
End: 2018-10-18
Payer: COMMERCIAL

## 2018-10-18 DIAGNOSIS — Z47.89 AFTERCARE FOLLOWING SURGERY OF THE MUSCULOSKELETAL SYSTEM: ICD-10-CM

## 2018-10-18 DIAGNOSIS — M25.571 PAIN IN JOINT, ANKLE AND FOOT, RIGHT: ICD-10-CM

## 2018-10-18 PROCEDURE — 97140 MANUAL THERAPY 1/> REGIONS: CPT | Mod: GP | Performed by: PHYSICAL THERAPIST

## 2018-10-18 PROCEDURE — 97110 THERAPEUTIC EXERCISES: CPT | Mod: GP | Performed by: PHYSICAL THERAPIST

## 2018-10-18 PROCEDURE — 97112 NEUROMUSCULAR REEDUCATION: CPT | Mod: GP | Performed by: PHYSICAL THERAPIST

## 2018-10-18 NOTE — MR AVS SNAPSHOT
After Visit Summary   10/18/2018    Duran Garner    MRN: [id number]           Patient Information     Date Of Birth          1960        Visit Information        Provider Department      10/18/2018 9:40 AM Paolo Gilbert, PT [facility]        Today's Diagnoses     Pain in joint, ankle and foot, right        Aftercare following surgery of the musculoskeletal system           Follow-ups after your visit        Who to contact     If you have questions or need follow up information about today's clinic visit or your schedule please contact [facility] directly at [phone].  Normal or non-critical lab and imaging results will be communicated to you by [employer], letter or phone within 4 business days after the clinic has received the results. If you do not hear from us within 7 days, please contact the clinic through [employer] or phone. If you have a critical or abnormal lab result, we will notify you by phone as soon as possible.  Submit refill requests through [employer] or call your pharmacy and they will forward the refill request to us. Please allow 3 business days for your refill to be completed.          Additional Information About Your Visit        MyChart Information     [employer] gives you secure access to your electronic health record. If you see a primary care provider, you can also send messages to your care team and make appointments. If you have questions, please call your primary care clinic.  If you do not have a primary care provider, please call [phone] and they will assist you.        Care EveryWhere ID     This is your Care EveryWhere ID. This could be used by other organizations to access your [place] medical records  [id number]         Blood Pressure from Last 3 Encounters:   06/21/18 120/72   08/11/17 96/68   11/20/15 112/72    Weight from Last 3 Encounters:   08/11/17 73.9 kg (163 lb)   11/20/15 79.4  kg (175 lb)   11/18/15 79.4 kg (175 lb)              We Performed the Following     MANUAL THER TECH,1+REGIONS,EA 15 MIN     NEUROMUSCULAR RE-EDUCATION     THERAPEUTIC EXERCISES        Primary Care Provider Office Phone # Fax #    Usama Interiano PA-C [phone] [address]  [facility] [address]        Equal Access to Services     [facility]: Hadii aad ku hadasho Soomaali, waaxda luqadaha, qaybta kaalmada adeegyada, waxay idiin hayaan adeeg kharash la'aan ah. So [facility] [phone].    ATENCIÓN: Si habla español, tiene a albert disposición servicios gratuitos de asistencia lingüística. Hannaame al [phone].    We comply with applicable federal civil rights laws and [place] laws. We do not discriminate on the basis of race, color, national origin, age, disability, sex, sexual orientation, or gender identity.            Thank you!     Thank you for choosing [facility]  [facility]. Our goal is always to provide you with excellent care. Hearing back from our patients is one way we can continue to improve our services. Please take a few minutes to complete the written survey that you may receive in the mail after your visit with us. Thank you!             Your Updated Medication List - Protect others around you: Learn how to safely use, store and throw away your medicines at www.disposemymeds.org.          This list is accurate as of 10/18/18 10:51 AM.  Always use your most recent med list.                   Brand Name Dispense Instructions for use Diagnosis    NO ACTIVE MEDICATIONS

## 2018-12-12 PROBLEM — M25.571 PAIN IN JOINT, ANKLE AND FOOT, RIGHT: Status: RESOLVED | Noted: 2018-07-05 | Resolved: 2018-12-12

## 2018-12-12 PROBLEM — Z47.89 AFTERCARE FOLLOWING SURGERY OF THE MUSCULOSKELETAL SYSTEM: Status: RESOLVED | Noted: 2018-07-05 | Resolved: 2018-12-12

## 2018-12-12 NOTE — PROGRESS NOTES
Discharge Note    Progress reporting period is from initial eval to Oct 18, 2018.     Please see information below for last relevant information on current status.  Patient seen for 7 visits.  SUBJECTIVE  Subjective changes noted by patient:  rt plantar fascia, fibroma. See previous subjective.  Has returned to biking and golfing.   Current pain level is 0/10.     Previous pain level was  2/10.   Changes in function:  Yes (See Goal flowsheet attached for changes in current functional level)  Adverse reaction to treatment or activity: None    OBJECTIVE  Changes noted in objective findings: DF rt 10, lt 10.  able to get a stretch in rt gastroc after rx.  SLS on rt limited to 5 sec EC compared to 15 sec EC on lt.       ASSESSMENT/PLAN  Diagnosis: rt ankle, fibula fracture, ORIF   DIAGP:  Diagnoses of Pain in joint, ankle and foot, right and Aftercare following surgery of the musculoskeletal system were pertinent to this visit.  Updated problem list and treatment plan:   Decreased ROM/flexibility - HEP  Decreased strength - HEP  Impaired gait - HEP  Decreased proprioception - HEP  STG/LTGs have been met or progress has been made towards goals:  Yes, please see goal flowsheet for most current information  Assessment of Progress: current status is unknown.    Last current status: Pt is progressing well   Self Management Plans:  MARY Garzon continues to require the following intervention to meet STG and LTG's:  HEP.    Recommendations:  Discharge with current home program.  Patient to follow up with MD as needed.    Please refer to the daily flowsheet for treatment today, total treatment time and time spent performing 1:1 timed codes.    Thank you for referring E[facility] for PT at the [facility] in [place].  Yon Gilbert, PT

## 2018-12-19 ENCOUNTER — TELEPHONE (OUTPATIENT)
Dept: URGENT CARE | Facility: URGENT CARE | Age: 58
End: 2018-12-19

## 2018-12-19 ENCOUNTER — ANCILLARY PROCEDURE (OUTPATIENT)
Dept: GENERAL RADIOLOGY | Facility: CLINIC | Age: 58
End: 2018-12-19
Attending: PHYSICIAN ASSISTANT
Payer: COMMERCIAL

## 2018-12-19 ENCOUNTER — OFFICE VISIT (OUTPATIENT)
Dept: URGENT CARE | Facility: URGENT CARE | Age: 58
End: 2018-12-19
Payer: COMMERCIAL

## 2018-12-19 VITALS
DIASTOLIC BLOOD PRESSURE: 60 MMHG | SYSTOLIC BLOOD PRESSURE: 94 MMHG | TEMPERATURE: 97.7 F | HEART RATE: 77 BPM | OXYGEN SATURATION: 97 %

## 2018-12-19 DIAGNOSIS — L03.113 CELLULITIS OF RIGHT UPPER EXTREMITY: ICD-10-CM

## 2018-12-19 DIAGNOSIS — L03.113 CELLULITIS OF RIGHT UPPER EXTREMITY: Primary | ICD-10-CM

## 2018-12-19 LAB — WBC # BLD AUTO: 16.8 10E9/L (ref 4–11)

## 2018-12-19 PROCEDURE — 99214 OFFICE O/P EST MOD 30 MIN: CPT | Mod: 25 | Performed by: PHYSICIAN ASSISTANT

## 2018-12-19 PROCEDURE — 73130 X-RAY EXAM OF HAND: CPT | Mod: RT

## 2018-12-19 PROCEDURE — 96372 THER/PROPH/DIAG INJ SC/IM: CPT | Performed by: PHYSICIAN ASSISTANT

## 2018-12-19 PROCEDURE — 85048 AUTOMATED LEUKOCYTE COUNT: CPT | Performed by: PHYSICIAN ASSISTANT

## 2018-12-19 PROCEDURE — 36415 COLL VENOUS BLD VENIPUNCTURE: CPT | Performed by: PHYSICIAN ASSISTANT

## 2018-12-19 RX ORDER — CLINDAMYCIN HCL 150 MG
450 CAPSULE ORAL 4 TIMES DAILY
Qty: 40 CAPSULE | Refills: 0 | Status: SHIPPED | OUTPATIENT
Start: 2018-12-19 | End: 2018-12-19

## 2018-12-19 RX ORDER — CEFTRIAXONE SODIUM 1 G
1 VIAL (EA) INJECTION ONCE
Status: COMPLETED | OUTPATIENT
Start: 2018-12-19 | End: 2018-12-19

## 2018-12-19 RX ORDER — CLINDAMYCIN HCL 150 MG
450 CAPSULE ORAL 4 TIMES DAILY
Qty: 120 CAPSULE | Refills: 0 | Status: SHIPPED | OUTPATIENT
Start: 2018-12-19 | End: 2018-12-20

## 2018-12-19 RX ORDER — CLINDAMYCIN HCL 150 MG
450 CAPSULE ORAL 4 TIMES DAILY
Qty: 120 CAPSULE | Refills: 0 | Status: ON HOLD | OUTPATIENT
Start: 2018-12-19 | End: 2018-12-23

## 2018-12-19 RX ADMIN — Medication 1 G: at 11:07

## 2018-12-19 NOTE — TELEPHONE ENCOUNTER
Rx was sent to a different pharmacy ([facility]) where patient had requested it sent during visit.     Called [facility] to cancel order. Patient will  at [facility] and dosage is correct.     Mario Joshua, Medical Assistant

## 2018-12-19 NOTE — TELEPHONE ENCOUNTER
Pharmacy called to request a new prescription for clindamycin.  The disp amount doesn't match with the instructions.  Please correct and resend or call back if questions.

## 2018-12-19 NOTE — PATIENT INSTRUCTIONS
Follow up tomorrow for recheck and repeat shot  Take yogurt and probiotic throughout course of treatment  Rest arm in sling throughout day - minimize use  Follow up IMMEDIATELY with worsening of symptoms, fever, sense of illness, etc...    Patient Education     Cellulitis  Cellulitis is an infection of the deep layers of skin. A break in the skin, such as a cut or scratch, can let bacteria under the skin. If the bacteria get to deep layers of the skin, it can be serious. If not treated, cellulitis can get into the bloodstream and lymph nodes. The infection can then spread throughout the body. This causes serious illness.  Cellulitis causes the affected skin to become red, swollen, warm, and sore. The reddened areas have a visible border. An open sore may leak fluid (pus). You may have a fever, chills, and pain.  Cellulitis is treated with antibiotics taken for 7 to 10 days. An open sore may be cleaned and covered with cool wet gauze. Symptoms should get better 1 to 2 days after treatment is started. Make sure to take all the antibiotics for the full number of days until they are gone. Keep taking the medicine even if your symptoms go away.  Home care  Follow these tips:    Limit the use of the part of your body with cellulitis.     If the infection is on your leg, keep your leg raised while sitting. This will help to reduce swelling.    Take all of the antibiotic medicine exactly as directed until it is gone. Do not miss any doses, especially during the first 7 days. Don t stop taking the medicine when your symptoms get better.    Keep the affected area clean and dry.    Wash your hands with soap and warm water before and after touching your skin. Anyone else who touches your skin should also wash his or her hands. Don't share towels.  Follow-up care  Follow up with your healthcare provider, or as advised. If your infection does not go away on the first antibiotic, your healthcare provider will prescribe a different  one.  When to seek medical advice  Call your healthcare provider right away if any of these occur:    Red areas that spread    Swelling or pain that gets worse    Fluid leaking from the skin (pus)    Fever higher of 100.4  F (38.0  C) or higher after 2 days on antibiotics  Date Last Reviewed: 9/1/2016 2000-2018 [employer]. [address] [place] [address]. All rights reserved. This information is not intended as a substitute for professional medical care. Always follow your healthcare professional's instructions.

## 2018-12-20 ENCOUNTER — OFFICE VISIT (OUTPATIENT)
Dept: URGENT CARE | Facility: URGENT CARE | Age: 58
End: 2018-12-20
Payer: COMMERCIAL

## 2018-12-20 ENCOUNTER — TELEPHONE (OUTPATIENT)
Dept: URGENT CARE | Facility: URGENT CARE | Age: 58
End: 2018-12-20

## 2018-12-20 ENCOUNTER — HOSPITAL ENCOUNTER (INPATIENT)
Facility: CLINIC | Age: 58
LOS: 3 days | Discharge: HOME | DRG: 603 | End: 2018-12-23
Attending: EMERGENCY MEDICINE | Admitting: INTERNAL MEDICINE
Payer: COMMERCIAL

## 2018-12-20 VITALS
DIASTOLIC BLOOD PRESSURE: 90 MMHG | SYSTOLIC BLOOD PRESSURE: 134 MMHG | TEMPERATURE: 98.4 F | HEART RATE: 78 BPM | OXYGEN SATURATION: 99 %

## 2018-12-20 DIAGNOSIS — L03.113 CELLULITIS OF RIGHT UPPER EXTREMITY: ICD-10-CM

## 2018-12-20 DIAGNOSIS — L03.119 CELLULITIS OF HAND: ICD-10-CM

## 2018-12-20 DIAGNOSIS — L03.113 CELLULITIS OF RIGHT UPPER EXTREMITY: Primary | ICD-10-CM

## 2018-12-20 PROBLEM — L03.90 CELLULITIS: Status: ACTIVE | Noted: 2018-12-20

## 2018-12-20 LAB
ANION GAP SERPL CALCULATED.3IONS-SCNC: 5 MMOL/L (ref 3–14)
BASOPHILS # BLD AUTO: 0 10E9/L (ref 0–0.2)
BASOPHILS NFR BLD AUTO: 0.1 %
BUN SERPL-MCNC: 17 MG/DL (ref 7–30)
CALCIUM SERPL-MCNC: 9.1 MG/DL (ref 8.5–10.1)
CHLORIDE SERPL-SCNC: 106 MMOL/L (ref 94–109)
CO2 SERPL-SCNC: 30 MMOL/L (ref 20–32)
CREAT SERPL-MCNC: 0.87 MG/DL (ref 0.66–1.25)
DIFFERENTIAL METHOD BLD: ABNORMAL
EOSINOPHIL # BLD AUTO: 0.3 10E9/L (ref 0–0.7)
EOSINOPHIL NFR BLD AUTO: 1.5 %
ERYTHROCYTE [DISTWIDTH] IN BLOOD BY AUTOMATED COUNT: 12.6 % (ref 10–15)
GFR SERPL CREATININE-BSD FRML MDRD: >90 ML/MIN/{1.73_M2}
GLUCOSE SERPL-MCNC: 96 MG/DL (ref 70–99)
HCT VFR BLD AUTO: 44 % (ref 40–53)
HGB BLD-MCNC: 14.8 G/DL (ref 13.3–17.7)
LACTATE BLD-SCNC: 0.8 MMOL/L (ref 0.7–2)
LYMPHOCYTES # BLD AUTO: 3 10E9/L (ref 0.8–5.3)
LYMPHOCYTES NFR BLD AUTO: 17 %
MCH RBC QN AUTO: 31.6 PG (ref 26.5–33)
MCHC RBC AUTO-ENTMCNC: 33.6 G/DL (ref 31.5–36.5)
MCV RBC AUTO: 94 FL (ref 78–100)
MONOCYTES # BLD AUTO: 2.1 10E9/L (ref 0–1.3)
MONOCYTES NFR BLD AUTO: 11.7 %
NEUTROPHILS # BLD AUTO: 12.4 10E9/L (ref 1.6–8.3)
NEUTROPHILS NFR BLD AUTO: 69.7 %
PLATELET # BLD AUTO: 260 10E9/L (ref 150–450)
POTASSIUM SERPL-SCNC: 4 MMOL/L (ref 3.4–5.3)
RBC # BLD AUTO: 4.68 10E12/L (ref 4.4–5.9)
SODIUM SERPL-SCNC: 141 MMOL/L (ref 133–144)
WBC # BLD AUTO: 17.8 10E9/L (ref 4–11)

## 2018-12-20 PROCEDURE — 25000128 H RX IP 250 OP 636: Performed by: EMERGENCY MEDICINE

## 2018-12-20 PROCEDURE — 80048 BASIC METABOLIC PNL TOTAL CA: CPT | Performed by: EMERGENCY MEDICINE

## 2018-12-20 PROCEDURE — 12000007 ZZH R&B INTERMEDIATE

## 2018-12-20 PROCEDURE — 87040 BLOOD CULTURE FOR BACTERIA: CPT | Performed by: EMERGENCY MEDICINE

## 2018-12-20 PROCEDURE — 83605 ASSAY OF LACTIC ACID: CPT | Performed by: EMERGENCY MEDICINE

## 2018-12-20 PROCEDURE — 96366 THER/PROPH/DIAG IV INF ADDON: CPT

## 2018-12-20 PROCEDURE — 85025 COMPLETE CBC W/AUTO DIFF WBC: CPT | Performed by: PHYSICIAN ASSISTANT

## 2018-12-20 PROCEDURE — 36415 COLL VENOUS BLD VENIPUNCTURE: CPT | Performed by: PHYSICIAN ASSISTANT

## 2018-12-20 PROCEDURE — 99207 ZZC OFFICE-HOSPITAL ADMIT: CPT | Performed by: PHYSICIAN ASSISTANT

## 2018-12-20 PROCEDURE — 96365 THER/PROPH/DIAG IV INF INIT: CPT

## 2018-12-20 PROCEDURE — 99285 EMERGENCY DEPT VISIT HI MDM: CPT | Mod: 25

## 2018-12-20 PROCEDURE — 96375 TX/PRO/DX INJ NEW DRUG ADDON: CPT

## 2018-12-20 RX ORDER — MORPHINE SULFATE 4 MG/ML
4 INJECTION, SOLUTION INTRAMUSCULAR; INTRAVENOUS ONCE
Status: COMPLETED | OUTPATIENT
Start: 2018-12-20 | End: 2018-12-20

## 2018-12-20 RX ORDER — CEFAZOLIN SODIUM 1 G/50ML
1250 SOLUTION INTRAVENOUS ONCE
Status: COMPLETED | OUTPATIENT
Start: 2018-12-20 | End: 2018-12-20

## 2018-12-20 RX ADMIN — MORPHINE SULFATE 4 MG: 4 INJECTION INTRAVENOUS at 21:28

## 2018-12-20 RX ADMIN — VANCOMYCIN HYDROCHLORIDE 1250 MG: 5 INJECTION, POWDER, LYOPHILIZED, FOR SOLUTION INTRAVENOUS at 21:42

## 2018-12-20 ASSESSMENT — ENCOUNTER SYMPTOMS
FEVER: 0
NAUSEA: 0
VOMITING: 0
WOUND: 1
COLOR CHANGE: 1
CHILLS: 0

## 2018-12-20 ASSESSMENT — MIFFLIN-ST. JEOR: SCORE: 1580.15

## 2018-12-20 NOTE — TELEPHONE ENCOUNTER
Called x2 times. After speaking with patient and explaining that Ed called in today, but was supposed to work today. Patient accepted that he could be seen by another provider.     Patient states that his hand feels a little bit better, but wants another abx injection.  Pt wants to be seen in urgent care. No appointments available upstairs, although offered. Stated that he will be here around 4PM.     Instructed that he will be charged for another Urgent Care visit if he decides to go to Urgent Care. Also Advised to seek ER if symptoms worsen.    Mario Joshua, Medical Assistant

## 2018-12-20 NOTE — PROGRESS NOTES
SUBJECTIVE:  Duran Garnre is a 58 year old male who presents to the clinic today for a rash.  Onset of rash was 3 day(s) ago.   Rash is sudden onset and worsening.  Location of the rash: hand.  Quality/symptoms of rash: burning, painful and red   Symptoms are moderate and rash seems to be worsening.  Previous history of a similar rash? No  Recent exposure history: sore on finger attributed to ingrown hair    Associated symptoms include: fever.    Past Medical History:   Diagnosis Date     NO ACTIVE PROBLEMS      Current Outpatient Medications   Medication Sig Dispense Refill     clindamycin (CLEOCIN) 150 MG capsule Take 3 capsules (450 mg) by mouth 4 times daily for 10 days 120 capsule 0     clindamycin (CLEOCIN) 150 MG capsule Take 3 capsules (450 mg) by mouth 4 times daily for 10 days 120 capsule 0     NO ACTIVE MEDICATIONS        Social History     Tobacco Use     Smoking status: Never Smoker     Smokeless tobacco: Never Used   Substance Use Topics     Alcohol use: Yes     Alcohol/week: 0.0 - 0.5 oz       ROS:  Review of systems negative except as stated above.    EXAM:   BP 94/60 (BP Location: Right arm, Patient Position: Chair, Cuff Size: Adult Regular)   Pulse 77   Temp 97.7  F (36.5  C) (Tympanic)   SpO2 97%   GENERAL: alert, no acute distress.  SKIN: Rash description:    Distribution: Red, warm, swollen and tender extedning from finger onto dorsum of hand  GENERAL APPEARANCE: healthy, alert and no distress  EYES: conjunctiva clear  RESP: lungs clear to auscultation - no rales, rhonchi or wheezes  CV: regular rates and rhythm, normal S1 S2, no murmur noted    XR: WNL on initial read by provider, radiologist read pending    Results for orders placed or performed in visit on 12/19/18   WBC count   Result Value Ref Range    WBC 16.8 (H) 4.0 - 11.0 10e9/L     ASSESSMENT:  (L03.113) Cellulitis of right upper extremity  (primary encounter diagnosis)  Comment:  Advancing infection of hand, no evidence of  osteomyelitis on xr read by provider, WBC elevated  Plan: XR Hand Right G/E 3 Views, cefTRIAXone         (ROCEPHIN) injection 1 g, WBC count,         CEFTRIAXONE NA INJ /250MG, INJECTION         INTRAMUSCULAR OR SUB-Q, clindamycin (CLEOCIN)         150 MG capsule, clindamycin (CLEOCIN) 150 MG         capsule, DISCONTINUED: clindamycin (CLEOCIN)         150 MG capsule  Return tomorrow for possible second shot of rocephin  Red flags and low threshold for emergent follow up discussed, and understood by patient  Follow up with PCP if symptoms worsen or fail to improve    Patient Instructions   Follow up tomorrow for recheck and repeat shot  Take yogurt and probiotic throughout course of treatment  Rest arm in sling throughout day - minimize use  Follow up IMMEDIATELY with worsening of symptoms, fever, sense of illness, etc...    Patient Education     Cellulitis  Cellulitis is an infection of the deep layers of skin. A break in the skin, such as a cut or scratch, can let bacteria under the skin. If the bacteria get to deep layers of the skin, it can be serious. If not treated, cellulitis can get into the bloodstream and lymph nodes. The infection can then spread throughout the body. This causes serious illness.  Cellulitis causes the affected skin to become red, swollen, warm, and sore. The reddened areas have a visible border. An open sore may leak fluid (pus). You may have a fever, chills, and pain.  Cellulitis is treated with antibiotics taken for 7 to 10 days. An open sore may be cleaned and covered with cool wet gauze. Symptoms should get better 1 to 2 days after treatment is started. Make sure to take all the antibiotics for the full number of days until they are gone. Keep taking the medicine even if your symptoms go away.  Home care  Follow these tips:    Limit the use of the part of your body with cellulitis.     If the infection is on your leg, keep your leg raised while sitting. This will help to reduce  swelling.    Take all of the antibiotic medicine exactly as directed until it is gone. Do not miss any doses, especially during the first 7 days. Don t stop taking the medicine when your symptoms get better.    Keep the affected area clean and dry.    Wash your hands with soap and warm water before and after touching your skin. Anyone else who touches your skin should also wash his or her hands. Don't share towels.  Follow-up care  Follow up with your healthcare provider, or as advised. If your infection does not go away on the first antibiotic, your healthcare provider will prescribe a different one.  When to seek medical advice  Call your healthcare provider right away if any of these occur:    Red areas that spread    Swelling or pain that gets worse    Fluid leaking from the skin (pus)    Fever higher of 100.4  F (38.0  C) or higher after 2 days on antibiotics  Date Last Reviewed: 9/1/2016 2000-2018 [employer]. [address] [place] [address], [place] [address]. All rights reserved. This information is not intended as a substitute for professional medical care. Always follow your healthcare professional's instructions.

## 2018-12-20 NOTE — TELEPHONE ENCOUNTER
"Reason for call:  Other   Patient called regarding (reason for call): call back  Additional comments: Patient states that Ed Jarvis advised he return for reevaluation today. Patient would like a call back to discuss need to return. Patient was requesting appointment. Explained that we do not offer appointments for urgent care. No open appointments with FP/IM upstairs and patient declined seeing another provider because \"they won't be able to compare to yesterday\". Please call patient to discuss options.      Phone number to reach patient:  Home number on file [phone] (home)    Best Time:  any    Can we leave a detailed message on this number?  YES    "

## 2018-12-21 ENCOUNTER — APPOINTMENT (OUTPATIENT)
Dept: CT IMAGING | Facility: CLINIC | Age: 58
DRG: 603 | End: 2018-12-21
Attending: INTERNAL MEDICINE
Payer: COMMERCIAL

## 2018-12-21 LAB
ANION GAP SERPL CALCULATED.3IONS-SCNC: 7 MMOL/L (ref 3–14)
BUN SERPL-MCNC: 16 MG/DL (ref 7–30)
CALCIUM SERPL-MCNC: 8.9 MG/DL (ref 8.5–10.1)
CHLORIDE SERPL-SCNC: 110 MMOL/L (ref 94–109)
CO2 SERPL-SCNC: 26 MMOL/L (ref 20–32)
CREAT SERPL-MCNC: 0.87 MG/DL (ref 0.66–1.25)
ERYTHROCYTE [DISTWIDTH] IN BLOOD BY AUTOMATED COUNT: 12.9 % (ref 10–15)
GFR SERPL CREATININE-BSD FRML MDRD: >90 ML/MIN/{1.73_M2}
GLUCOSE SERPL-MCNC: 90 MG/DL (ref 70–99)
HCT VFR BLD AUTO: 40.3 % (ref 40–53)
HGB BLD-MCNC: 14 G/DL (ref 13.3–17.7)
MCH RBC QN AUTO: 32 PG (ref 26.5–33)
MCHC RBC AUTO-ENTMCNC: 34.7 G/DL (ref 31.5–36.5)
MCV RBC AUTO: 92 FL (ref 78–100)
PLATELET # BLD AUTO: 218 10E9/L (ref 150–450)
POTASSIUM SERPL-SCNC: 4.1 MMOL/L (ref 3.4–5.3)
RBC # BLD AUTO: 4.38 10E12/L (ref 4.4–5.9)
SODIUM SERPL-SCNC: 143 MMOL/L (ref 133–144)
WBC # BLD AUTO: 12.7 10E9/L (ref 4–11)

## 2018-12-21 PROCEDURE — 85027 COMPLETE CBC AUTOMATED: CPT | Performed by: INTERNAL MEDICINE

## 2018-12-21 PROCEDURE — 25000132 ZZH RX MED GY IP 250 OP 250 PS 637: Performed by: INTERNAL MEDICINE

## 2018-12-21 PROCEDURE — 25000128 H RX IP 250 OP 636: Performed by: INTERNAL MEDICINE

## 2018-12-21 PROCEDURE — 73200 CT UPPER EXTREMITY W/O DYE: CPT | Mod: RT

## 2018-12-21 PROCEDURE — 12000007 ZZH R&B INTERMEDIATE

## 2018-12-21 PROCEDURE — 25000128 H RX IP 250 OP 636

## 2018-12-21 PROCEDURE — 99223 1ST HOSP IP/OBS HIGH 75: CPT | Mod: AI | Performed by: INTERNAL MEDICINE

## 2018-12-21 PROCEDURE — 36415 COLL VENOUS BLD VENIPUNCTURE: CPT | Performed by: INTERNAL MEDICINE

## 2018-12-21 PROCEDURE — 80048 BASIC METABOLIC PNL TOTAL CA: CPT | Performed by: INTERNAL MEDICINE

## 2018-12-21 RX ORDER — ONDANSETRON 4 MG/1
4 TABLET, ORALLY DISINTEGRATING ORAL EVERY 6 HOURS PRN
Status: DISCONTINUED | OUTPATIENT
Start: 2018-12-21 | End: 2018-12-23 | Stop reason: HOSPADM

## 2018-12-21 RX ORDER — SODIUM CHLORIDE 9 MG/ML
INJECTION, SOLUTION INTRAVENOUS CONTINUOUS
Status: DISCONTINUED | OUTPATIENT
Start: 2018-12-21 | End: 2018-12-23 | Stop reason: HOSPADM

## 2018-12-21 RX ORDER — ACETAMINOPHEN 325 MG/1
650 TABLET ORAL EVERY 4 HOURS PRN
Status: DISCONTINUED | OUTPATIENT
Start: 2018-12-21 | End: 2018-12-23 | Stop reason: HOSPADM

## 2018-12-21 RX ORDER — POTASSIUM CHLORIDE 29.8 MG/ML
20 INJECTION INTRAVENOUS
Status: DISCONTINUED | OUTPATIENT
Start: 2018-12-21 | End: 2018-12-23 | Stop reason: HOSPADM

## 2018-12-21 RX ORDER — CEFAZOLIN SODIUM 1 G/50ML
1250 SOLUTION INTRAVENOUS EVERY 12 HOURS
Status: DISCONTINUED | OUTPATIENT
Start: 2018-12-21 | End: 2018-12-22

## 2018-12-21 RX ORDER — AMOXICILLIN 250 MG
2 CAPSULE ORAL 2 TIMES DAILY
Status: DISCONTINUED | OUTPATIENT
Start: 2018-12-21 | End: 2018-12-23 | Stop reason: HOSPADM

## 2018-12-21 RX ORDER — HYDROCODONE BITARTRATE AND ACETAMINOPHEN 5; 325 MG/1; MG/1
1-2 TABLET ORAL EVERY 4 HOURS PRN
Status: DISCONTINUED | OUTPATIENT
Start: 2018-12-21 | End: 2018-12-23 | Stop reason: HOSPADM

## 2018-12-21 RX ORDER — POTASSIUM CHLORIDE 1500 MG/1
20-40 TABLET, EXTENDED RELEASE ORAL
Status: DISCONTINUED | OUTPATIENT
Start: 2018-12-21 | End: 2018-12-23 | Stop reason: HOSPADM

## 2018-12-21 RX ORDER — AMOXICILLIN 250 MG
1 CAPSULE ORAL 2 TIMES DAILY
Status: DISCONTINUED | OUTPATIENT
Start: 2018-12-21 | End: 2018-12-23 | Stop reason: HOSPADM

## 2018-12-21 RX ORDER — HYDROMORPHONE HYDROCHLORIDE 1 MG/ML
.3-.5 INJECTION, SOLUTION INTRAMUSCULAR; INTRAVENOUS; SUBCUTANEOUS
Status: DISCONTINUED | OUTPATIENT
Start: 2018-12-21 | End: 2018-12-23 | Stop reason: HOSPADM

## 2018-12-21 RX ORDER — CEFAZOLIN SODIUM 1 G/3ML
1 INJECTION, POWDER, FOR SOLUTION INTRAMUSCULAR; INTRAVENOUS EVERY 8 HOURS
Status: DISCONTINUED | OUTPATIENT
Start: 2018-12-21 | End: 2018-12-22

## 2018-12-21 RX ORDER — ONDANSETRON 2 MG/ML
4 INJECTION INTRAMUSCULAR; INTRAVENOUS EVERY 6 HOURS PRN
Status: DISCONTINUED | OUTPATIENT
Start: 2018-12-21 | End: 2018-12-23 | Stop reason: HOSPADM

## 2018-12-21 RX ORDER — POTASSIUM CHLORIDE 1.5 G/1.58G
20-40 POWDER, FOR SOLUTION ORAL
Status: DISCONTINUED | OUTPATIENT
Start: 2018-12-21 | End: 2018-12-23 | Stop reason: HOSPADM

## 2018-12-21 RX ORDER — NALOXONE HYDROCHLORIDE 0.4 MG/ML
.1-.4 INJECTION, SOLUTION INTRAMUSCULAR; INTRAVENOUS; SUBCUTANEOUS
Status: DISCONTINUED | OUTPATIENT
Start: 2018-12-21 | End: 2018-12-23 | Stop reason: HOSPADM

## 2018-12-21 RX ORDER — POTASSIUM CHLORIDE 7.45 MG/ML
10 INJECTION INTRAVENOUS
Status: DISCONTINUED | OUTPATIENT
Start: 2018-12-21 | End: 2018-12-23 | Stop reason: HOSPADM

## 2018-12-21 RX ORDER — POTASSIUM CL/LIDO/0.9 % NACL 10MEQ/0.1L
10 INTRAVENOUS SOLUTION, PIGGYBACK (ML) INTRAVENOUS
Status: DISCONTINUED | OUTPATIENT
Start: 2018-12-21 | End: 2018-12-23 | Stop reason: HOSPADM

## 2018-12-21 RX ADMIN — VANCOMYCIN HYDROCHLORIDE 1250 MG: 5 INJECTION, POWDER, LYOPHILIZED, FOR SOLUTION INTRAVENOUS at 20:44

## 2018-12-21 RX ADMIN — CEFAZOLIN SODIUM 1 G: 1 INJECTION, POWDER, FOR SOLUTION INTRAMUSCULAR; INTRAVENOUS at 16:45

## 2018-12-21 RX ADMIN — SODIUM CHLORIDE: 9 INJECTION, SOLUTION INTRAVENOUS at 01:31

## 2018-12-21 RX ADMIN — CEFAZOLIN SODIUM 1 G: 1 INJECTION, POWDER, FOR SOLUTION INTRAMUSCULAR; INTRAVENOUS at 01:31

## 2018-12-21 RX ADMIN — VANCOMYCIN HYDROCHLORIDE 1250 MG: 5 INJECTION, POWDER, LYOPHILIZED, FOR SOLUTION INTRAVENOUS at 10:45

## 2018-12-21 RX ADMIN — SENNOSIDES AND DOCUSATE SODIUM 1 TABLET: 8.6; 5 TABLET ORAL at 08:16

## 2018-12-21 RX ADMIN — CEFAZOLIN SODIUM 1 G: 1 INJECTION, POWDER, FOR SOLUTION INTRAMUSCULAR; INTRAVENOUS at 08:16

## 2018-12-21 ASSESSMENT — ACTIVITIES OF DAILY LIVING (ADL)
BATHING: 0-->INDEPENDENT
DRESS: 0-->INDEPENDENT
ADLS_ACUITY_SCORE: 10
ADLS_ACUITY_SCORE: 10
RETIRED_EATING: 0-->INDEPENDENT
TRANSFERRING: 0-->INDEPENDENT
SWALLOWING: 0-->SWALLOWS FOODS/LIQUIDS WITHOUT DIFFICULTY
FALL_HISTORY_WITHIN_LAST_SIX_MONTHS: NO
WHICH_OF_THE_ABOVE_FUNCTIONAL_RISKS_HAD_A_RECENT_ONSET_OR_CHANGE?: BATHING
ADLS_ACUITY_SCORE: 10
ADLS_ACUITY_SCORE: 10
AMBULATION: 0-->INDEPENDENT
TOILETING: 0-->INDEPENDENT
ADLS_ACUITY_SCORE: 10
RETIRED_COMMUNICATION: 0-->UNDERSTANDS/COMMUNICATES WITHOUT DIFFICULTY
COGNITION: 0 - NO COGNITION ISSUES REPORTED
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
## 2018-12-21 NOTE — PLAN OF CARE
Patient arrived to unit at approximately 0030. Oriented to room, all questions answered. A&Ox4. VSS on RA. CMS intact, except right hand redness and swelling, unchanged. Denies PRN pain medications. Up independently to the bathroom. Patient slept between cares. NPO, plan for ortho to consult today. Will continue to monitor.

## 2018-12-21 NOTE — PROGRESS NOTES
SUBJECTIVE:   Duran Garner is a 58 year old male presenting with a chief complaint of   Chief Complaint   Patient presents with     Urgent Care     Patient was seen yesterday for infection on hand--was told to come back today if no improvement       He is an established patient of [facility].    Serafin    Duran Garner, a 58-year-old male presented to the urgent care for the evaluation of right upper extremity for 5  Days.  He was seen at the urgent care yesterday on 12/19/2018 for the evaluation of a sudden onset and gradually worsening rash on his right hand.  At the urgent care he was given injection Rocephin 1 g intramuscularly and was prescribed to take clindamycin at home.  Today he came in as his pain has worsened and the redness has spread beyond the marked area.  He mentioned that the swelling of the hand has increased as well.  He denied any fever, nausea, vomiting,, lightheadedness.  He does not have any tingling or numbness, or loss of sensation in his right hand.  He denied any trauma ,njury or insect bite.       Review of Systems: otherwise negative except as stated above.    Past Medical History:   Diagnosis Date     NO ACTIVE PROBLEMS      Family History   Problem Relation Age of Onset     C.A.D. Father      Prostate Cancer Father         dx age 70     Diabetes No family hx of      Cancer - colorectal No family hx of      Hypertension No family hx of      Current Outpatient Medications   Medication Sig Dispense Refill     clindamycin (CLEOCIN) 150 MG capsule Take 3 capsules (450 mg) by mouth 4 times daily for 10 days 120 capsule 0     clindamycin (CLEOCIN) 150 MG capsule Take 3 capsules (450 mg) by mouth 4 times daily for 10 days 120 capsule 0     NO ACTIVE MEDICATIONS        Social History     Tobacco Use     Smoking status: Never Smoker     Smokeless tobacco: Never Used   Substance Use Topics     Alcohol use: Yes     Alcohol/week: 0.0 - 0.5 oz       OBJECTIVE  /90   Pulse 78   Temp 98.4  F  (36.9  C) (Tympanic)   SpO2 99%     Physical Exam   Constitutional: He is oriented to person, place, and time. He appears well-developed and well-nourished.   Musculoskeletal:   Picture from yesterday's rash on his phone was compared to today's rash.  The swelling has increased.  The redness on the skin has passed beyond the marked area.  Active and passive range of motion of the right  elbow and shoulder is intact active and passive range of motion of right wrist is decreased.  Due to swelling and pain.    Neurological: He is alert and oriented to person, place, and time. No sensory deficit.   Skin: Skin is warm.   Skin over the right dorsum of the hand is swollen warm, and erythematous.  The dorsum of the hand is tender on palpation.   Vitals reviewed.  :     Labs:  No results found for this or any previous visit (from the past 24 hour(s)).    X-Ray was not done.    ASSESSMENT:      ICD-10-CM    1. Cellulitis of right upper extremity L03.113 CBC with platelets and differential        Medical Decision Making:    Differential Diagnosis:  Cellulitis of right hand, contact dermatitis, insect bite, septic arthritis.     Patient was seen at the urgent care yesterday for the swelling of the hand  And was diagnosed with cellulitis of the right upper extremity.  He was given ceftriaxone 1 g IM and was prescribed clindamycin.  His swelling and redness has increased, pain in the arm has worsened.  As he was treated with intramuscular antibiotic and oral antibiotic and still the symptoms are persisting/worsening, it seems that he needs IV antibiotic and close monitoring of the upper extremity cellulitis.  I have discussed in detail about the patient's condition with him, and advised him to be seen at the ER immediately for further evaluation and management of the right hand cellulitis.    I have discussed the patient's diagnosis and my plan of treatment with the patient. We went over any labs or imaging. Patient is aware to  come back in with worsening symptoms or if no relief despite treatment plan.  Patient verbalizes understanding. All questions were addressed and answered.     Serious Comorbid Conditions:  Please see past medical history for details    PLAN:    Cellulitis of right upper extremity (treatment failure)  Patient is referred to the ER for further evaluation and management    Followup:    Transfer to ED via private car    There are no Patient Instructions on file for this visit.

## 2018-12-21 NOTE — PROGRESS NOTES
"Elevation of right hand by \"skyhook\" elevation using stockinette over right hand to above elbow and  tied to an IV pole with pillows under upper arm and no pressure under elbow. Patient instructed to let nurse know if he feels any new changes in sensation or pain and for rearranging of set-up for this elevation. Rita Herrera RN, ONC    "

## 2018-12-21 NOTE — PLAN OF CARE
RECEIVING UNIT ED HANDOFF REVIEW    ED Nurse Handoff Report was reviewed by: Edwina Mcguire on December 20, 2018 at 11:52 PM

## 2018-12-21 NOTE — PROGRESS NOTES
"[facility]    Internal Medicine Hospitalist Progress Note  12/21/2018  I evaluated patient on the above date.    Familia Vinson Jr., MD  [phone] (p)  Text Page        Assessment & Plan New actions/orders today (12/21/2018) are underlined.    Duran Garner is a 58 year old male without prior diagnosed medical problems, who presented 12/21 with right hand pain and swelling and found with evidence of cellulitis.     R hand and forearm cellulitis (see image below).  * Had \"pimple\" on his R 3rd finger about 4d PTA; he tried to pop it, no discharge/drainage; 2d later, increased erythema which spread to wrist. Seen in clinic 12/19 and hand x-rays negative for fracture; given dose of IM ceftriaxone and started on PO clindamycin.   * Symptoms worsened and presented to [facility] 12/20. Started on vanco and cefazolin and admit. Hand CT 12/21 showed subcutaneous edema along the dorsal aspect of the hand suggesting cellulitis; no other abnormality is seen with no evidence  of an abscess or osteomyelitis (though noted MRI better to evaluate bone involvement). Seen by Ortho 12/21 and no surgery indicated at this point.  * Micro: BC's 12/20 NGTD.  - Continue cefazolin (started 12/21) and vanco (started 12/20).  - Per Ortho, advance diet, then NPO after midnight in case of worsening.  - Monitor cultures.  - Appreciate help from Ortho.    Diet: NPO for Medical/Clinical Reasons Except for: Meds    Prophylaxis: PCD's, ambulation.   Villavicencio Catheter: not present  Code Status: Full Code      Disposition Plan   Expected discharge: 1-2d, recommended to prior living arrangement once determined not needing surgery, cellulitis improved and can transition to PO.  Entered: Familia Vinson MD 12/21/2018, 12:29 PM         Interval History   Feeling a bit better.   Denies chest pain, sob, N/V.    -Data reviewed today: I reviewed all new labs and imaging over the last 24 hours. I personally reviewed no images or EKG's " "today.    Physical Exam   Heart Rate: 63, Blood pressure 117/75, pulse 74, temperature 97.5  F (36.4  C), temperature source Oral, resp. rate 16, height 1.727 m (5' 8\"), weight 77.6 kg (171 lb), SpO2 99 %.  Vitals:    12/20/18 2036   Weight: 77.6 kg (171 lb)     Vital Signs with Ranges  Temp:  [97.3  F (36.3  C)-98.7  F (37.1  C)] 97.5  F (36.4  C)  Pulse:  [74-78] 74  Heart Rate:  [63-72] 63  Resp:  [16] 16  BP: (103-140)/(59-95) 117/75  SpO2:  [95 %-99 %] 99 %  Patient Vitals for the past 24 hrs:   BP Temp Temp src Pulse Heart Rate Resp SpO2 Height Weight   12/21/18 1156 117/75 97.5  F (36.4  C) Oral -- 63 16 99 % -- --   12/21/18 0747 116/67 98  F (36.7  C) Oral -- 71 16 96 % -- --   12/21/18 0452 106/59 98.7  F (37.1  C) Oral -- 67 16 98 % -- --   12/21/18 0116 -- -- -- -- -- 16 -- -- --   12/21/18 0100 103/67 97.3  F (36.3  C) Oral 74 70 16 95 % -- --   12/21/18 0003 115/80 -- -- -- 71 -- 96 % -- --   12/20/18 2036 (!) 140/95 97.7  F (36.5  C) Oral -- 72 16 99 % 1.727 m (5' 8\") 77.6 kg (171 lb)     I/O's Last 24 hours  No intake/output data recorded.    Constitutional: Alert, oriented, pleasant.  Respiratory: Clear, no crackles/wheezes.  Cardiovascular: RRR no m/r/g.  GI:   Skin/Integumen: See below.  Other:      R hand and forearm 12/21        Data   Recent Labs   Lab 12/21/18  0700 12/20/18 2111 12/20/18 1846 12/19/18  1123   WBC 12.7*  --  17.8* 16.8*   HGB 14.0  --  14.8  --    MCV 92  --  94  --      --  260  --     141  --   --    POTASSIUM 4.1 4.0  --   --    CHLORIDE 110* 106  --   --    CO2 26 30  --   --    BUN 16 17  --   --    CR 0.87 0.87  --   --    ANIONGAP 7 5  --   --    CAMI 8.9 9.1  --   --    GLC 90 96  --   --      Recent Labs   Lab Test 12/21/18  0700 12/20/18  2111 11/16/15  0943 09/15/11  0915   GLC 90 96 92 90         Recent Results (from the past 24 hour(s))   CT Hand Right w/o Contrast    Narrative    CT RIGHT HAND WITHOUT CONTRAST   12/21/2018 9:07 AM    HISTORY: Hand " swelling. Suspected infection.    COMPARISON: Radiographs on 12/19/2018.    TECHNIQUE: Routine CT imaging is performed through the right hand  without contrast. Radiation dose for this scan was reduced using  automated exposure control, adjustment of the mA and/or kV according  to patient size, or iterative reconstruction technique. Markers were  placed over the sites of clinical concern dorsally.    FINDINGS: No fracture or osseous lesion is demonstrated. No  significant joint space pathology is demonstrated. There appears to be  edema in the subcutaneous tissues along the dorsal aspect of the hand  with no distinct focal mass identified. No other abnormality is seen.      Impression    IMPRESSION: Subcutaneous edema along the dorsal aspect of the hand  suggesting cellulitis. No other abnormality is seen with no evidence  of an abscess or osteomyelitis. Note that CT is not ideal for the  evaluation of bone infection. If there is clinical concern for  osteomyelitis and further imaging is needed, an MRI could be  considered. If contraindicated, a bone scan could also be utilized.    DANILO LIN MD       Medications   All medications were reviewed.    sodium chloride 100 mL/hr at 12/21/18 0131       ceFAZolin  1 g Intravenous Q8H     senna-docusate  1 tablet Oral BID    Or     senna-docusate  2 tablet Oral BID     vancomycin (VANCOCIN) IV  1,250 mg Intravenous Q12H

## 2018-12-21 NOTE — PLAN OF CARE
Alert, oriented x 4,   Cellulitis of the Right upper extremity, hand is swollen, erythema, has markings with pen.  Cms is good, patient is comfortable.  On IV fluids and Antibiotics. Had a CT scan this morning,  Right hand is elevated, ice applied, patient transitioned to regular diet.  Denies pain.
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
## 2018-12-21 NOTE — PHARMACY-ADMISSION MEDICATION HISTORY
Admission medication history interview status for the 12/20/2018  admission is complete. See EPIC admission navigator for prior to admission medications     Medication history source reliability:Good    Actions taken by pharmacist (provider contacted, etc):None     Additional medication history information not noted on PTA med list :None    Medication reconciliation/reorder completed by provider prior to medication history? No    Time spent in this activity: 10 min    Prior to Admission medications    Medication Sig Last Dose Taking? Auth Provider   clindamycin (CLEOCIN) 150 MG capsule Take 3 capsules (450 mg) by mouth 4 times daily for 10 days 12/20/2018 at x2 Yes Kris Jarvis PA-C

## 2018-12-21 NOTE — ED NOTES
"[facility]  ED Nurse Handoff Report    ED Chief complaint: Wound Check (Right hand swelling and erythema since Sunday. Taking antibiotics but now not helping. )      ED Diagnosis:   Final diagnoses:   Cellulitis of hand       Code Status: Full Code    Allergies:   Allergies   Allergen Reactions     Ibuprofen Hives     Sulfa Drugs Hives     Chicken-Derived Products (Egg)      Stomach upset     Eggs      Stomach upset     Ibuprofen Sodium      Levaquin [Levofloxacin Hemihydrate]      Hives     Levofloxacin      Hives     Penicillins      As a child       Activity level - Baseline/Home:  Independent    Activity Level - Current:   Independent     Needed?: No    Isolation: No  Infection: Not Applicable  Bariatric?: No    Vital Signs:   Vitals:    12/20/18 2036   BP: (!) 140/95   Resp: 16   Temp: 97.7  F (36.5  C)   TempSrc: Oral   SpO2: 99%   Weight: 77.6 kg (171 lb)   Height: 1.727 m (5' 8\")       Cardiac Rhythm: ,        Pain level: 0-10 Pain Scale: 7    Is this patient confused?: No   Does this patient have a guardian?  No         If yes, is there guardianship documents in the Epic \"Code/ACP\" activity?  Yes         Guardian Notified?  N/A  [place] - Suicide Severity Rating Scale Completed?  Yes  If yes, what color did the patient score?  White    Patient Report: Initial Complaint: Patient presented with swelling/redness to the right hand. Known cellulitis.  Focused Assessment: The patient reports that he was seen and treated for cellulitis of the right hand.  The redness/swelling has moved beyond the initial site of infection and marked skin.  He received po antibiotics and an injection of rocephin yesterday morning.  Redness seen traveling up the right arm (new area marked and dated).  The patient is also having mild pain to the top of the hand.  IV antibiotics initiated.  Tests Performed: IV labs  Abnormal Results:   Treatments provided: IV abx and morphine 4mg IVP    Family Comments: " wife at bedside    OBS brochure/video discussed/provided to patient/family: No              Name of person given brochure if not patient:               Relationship to patient:   ED Medications:   Medications   vancomycin (VANCOCIN) 1,250 mg in sodium chloride 0.9 % 250 mL intermittent infusion (1,250 mg Intravenous New Bag 12/20/18 2142)   morphine (PF) injection 4 mg (4 mg Intravenous Given 12/20/18 2128)       Drips infusing?:  Yes    For the majority of the shift this patient was Green.   Interventions performed were .    Severe Sepsis OR Septic Shock Diagnosis Present: No    To be done/followed up on inpatient unit:      ED NURSE PHONE NUMBER: [phone]

## 2018-12-21 NOTE — PHARMACY-VANCOMYCIN DOSING SERVICE
Pharmacy Vancomycin Initial Note  Date of Service 2018  Patient's  1960  58 year old, male    Indication: Skin and Soft Tissue Infection    Current estimated CrCl = Estimated Creatinine Clearance: 104.1 mL/min (based on SCr of 0.87 mg/dL).    Creatinine for last 3 days  2018:  9:11 PM Creatinine 0.87 mg/dL    Recent Vancomycin Level(s) for last 3 days  No results found for requested labs within last 72 hours.      Vancomycin IV Administrations (past 72 hours)                   vancomycin (VANCOCIN) 1,250 mg in sodium chloride 0.9 % 250 mL intermittent infusion (mg) 1,250 mg New Bag 18 2142                Nephrotoxins and other renal medications (From now, onward)    Start     Dose/Rate Route Frequency Ordered Stop    18 0900  vancomycin (VANCOCIN) 1,250 mg in sodium chloride 0.9 % 250 mL intermittent infusion      1,250 mg  over 90 Minutes Intravenous EVERY 12 HOURS 18 0029            Contrast Orders - past 72 hours (72h ago, onward)    None                Plan:  1.  Start vancomycin  1250 mg IV q12h.   2.  Goal Trough Level: ~ 15   3.  Pharmacy will check trough levels as appropriate in 1-3 Days.    4. Serum creatinine levels will be ordered a minimum of twice weekly.    5. Hinckley method utilized to dose vancomycin therapy: Method 1    Selina Rodriguez

## 2018-12-22 LAB
ANION GAP SERPL CALCULATED.3IONS-SCNC: 7 MMOL/L (ref 3–14)
BASOPHILS # BLD AUTO: 0 10E9/L (ref 0–0.2)
BASOPHILS NFR BLD AUTO: 0.2 %
BUN SERPL-MCNC: 13 MG/DL (ref 7–30)
CALCIUM SERPL-MCNC: 8.9 MG/DL (ref 8.5–10.1)
CHLORIDE SERPL-SCNC: 108 MMOL/L (ref 94–109)
CO2 SERPL-SCNC: 26 MMOL/L (ref 20–32)
CREAT SERPL-MCNC: 0.76 MG/DL (ref 0.66–1.25)
CREAT SERPL-MCNC: 0.82 MG/DL (ref 0.66–1.25)
DIFFERENTIAL METHOD BLD: ABNORMAL
EOSINOPHIL # BLD AUTO: 0.4 10E9/L (ref 0–0.7)
EOSINOPHIL NFR BLD AUTO: 3.2 %
ERYTHROCYTE [DISTWIDTH] IN BLOOD BY AUTOMATED COUNT: 12.5 % (ref 10–15)
GFR SERPL CREATININE-BSD FRML MDRD: >90 ML/MIN/{1.73_M2}
GFR SERPL CREATININE-BSD FRML MDRD: >90 ML/MIN/{1.73_M2}
GLUCOSE SERPL-MCNC: 100 MG/DL (ref 70–99)
HCT VFR BLD AUTO: 41 % (ref 40–53)
HGB BLD-MCNC: 14.4 G/DL (ref 13.3–17.7)
IMM GRANULOCYTES # BLD: 0 10E9/L (ref 0–0.4)
IMM GRANULOCYTES NFR BLD: 0.3 %
LYMPHOCYTES # BLD AUTO: 2.9 10E9/L (ref 0.8–5.3)
LYMPHOCYTES NFR BLD AUTO: 24.6 %
MCH RBC QN AUTO: 32.1 PG (ref 26.5–33)
MCHC RBC AUTO-ENTMCNC: 35.1 G/DL (ref 31.5–36.5)
MCV RBC AUTO: 92 FL (ref 78–100)
MONOCYTES # BLD AUTO: 1.5 10E9/L (ref 0–1.3)
MONOCYTES NFR BLD AUTO: 12.9 %
NEUTROPHILS # BLD AUTO: 7.1 10E9/L (ref 1.6–8.3)
NEUTROPHILS NFR BLD AUTO: 58.8 %
NRBC # BLD AUTO: 0 10*3/UL
NRBC BLD AUTO-RTO: 0 /100
PLATELET # BLD AUTO: 237 10E9/L (ref 150–450)
POTASSIUM SERPL-SCNC: 4.2 MMOL/L (ref 3.4–5.3)
RBC # BLD AUTO: 4.48 10E12/L (ref 4.4–5.9)
SODIUM SERPL-SCNC: 141 MMOL/L (ref 133–144)
VANCOMYCIN SERPL-MCNC: 8.8 MG/L
WBC # BLD AUTO: 12 10E9/L (ref 4–11)

## 2018-12-22 PROCEDURE — 99231 SBSQ HOSP IP/OBS SF/LOW 25: CPT | Performed by: INTERNAL MEDICINE

## 2018-12-22 PROCEDURE — 99207 ZZC CDG-MDM COMPONENT: MEETS LOW - DOWN CODED: CPT | Performed by: INTERNAL MEDICINE

## 2018-12-22 PROCEDURE — 25000128 H RX IP 250 OP 636: Performed by: INTERNAL MEDICINE

## 2018-12-22 PROCEDURE — 85025 COMPLETE CBC W/AUTO DIFF WBC: CPT | Performed by: INTERNAL MEDICINE

## 2018-12-22 PROCEDURE — 25000132 ZZH RX MED GY IP 250 OP 250 PS 637: Performed by: INTERNAL MEDICINE

## 2018-12-22 PROCEDURE — 36415 COLL VENOUS BLD VENIPUNCTURE: CPT | Performed by: INTERNAL MEDICINE

## 2018-12-22 PROCEDURE — 12000007 ZZH R&B INTERMEDIATE

## 2018-12-22 PROCEDURE — 82565 ASSAY OF CREATININE: CPT | Performed by: INTERNAL MEDICINE

## 2018-12-22 PROCEDURE — 80202 ASSAY OF VANCOMYCIN: CPT | Performed by: INTERNAL MEDICINE

## 2018-12-22 PROCEDURE — 80048 BASIC METABOLIC PNL TOTAL CA: CPT | Performed by: INTERNAL MEDICINE

## 2018-12-22 PROCEDURE — 25000125 ZZHC RX 250: Performed by: INTERNAL MEDICINE

## 2018-12-22 RX ORDER — CLINDAMYCIN PHOSPHATE 900 MG/50ML
900 INJECTION, SOLUTION INTRAVENOUS EVERY 8 HOURS
Status: DISCONTINUED | OUTPATIENT
Start: 2018-12-22 | End: 2018-12-23 | Stop reason: HOSPADM

## 2018-12-22 RX ORDER — CEFAZOLIN SODIUM 2 G/100ML
2 INJECTION, SOLUTION INTRAVENOUS EVERY 8 HOURS
Status: DISCONTINUED | OUTPATIENT
Start: 2018-12-22 | End: 2018-12-23 | Stop reason: HOSPADM

## 2018-12-22 RX ADMIN — ACETAMINOPHEN 650 MG: 325 TABLET, FILM COATED ORAL at 11:21

## 2018-12-22 RX ADMIN — CEFAZOLIN SODIUM 2 G: 2 INJECTION, SOLUTION INTRAVENOUS at 23:49

## 2018-12-22 RX ADMIN — CEFAZOLIN SODIUM 1 G: 1 INJECTION, POWDER, FOR SOLUTION INTRAMUSCULAR; INTRAVENOUS at 07:57

## 2018-12-22 RX ADMIN — CLINDAMYCIN IN 5 PERCENT DEXTROSE 900 MG: 18 INJECTION, SOLUTION INTRAVENOUS at 13:48

## 2018-12-22 RX ADMIN — CLINDAMYCIN IN 5 PERCENT DEXTROSE 900 MG: 18 INJECTION, SOLUTION INTRAVENOUS at 20:12

## 2018-12-22 RX ADMIN — CEFAZOLIN SODIUM 1 G: 1 INJECTION, POWDER, FOR SOLUTION INTRAMUSCULAR; INTRAVENOUS at 00:39

## 2018-12-22 RX ADMIN — CEFAZOLIN SODIUM 2 G: 2 INJECTION, SOLUTION INTRAVENOUS at 16:49

## 2018-12-22 ASSESSMENT — ACTIVITIES OF DAILY LIVING (ADL)
ADLS_ACUITY_SCORE: 10

## 2018-12-22 NOTE — PROGRESS NOTES
"[facility]    Internal Medicine Hospitalist Progress Note  12/22/2018  I evaluated patient on the above date.    Assessment & Plan   Duran Garner is a 58 year old male without prior diagnosed medical problems, who presented 12/21 with right hand pain and swelling and found with evidence of cellulitis.     R hand and forearm cellulitis, improving.  * Had \"pimple\" on his R 3rd finger about 4d PTA; he tried to pop it, no discharge/drainage; 2d later, increased erythema which spread to wrist. Seen in clinic 12/19 and hand x-rays negative for fracture; given dose of IM ceftriaxone and started on PO clindamycin.   * Symptoms worsened and presented to [facility] 12/20. Started on vanco and cefazolin and admit. Hand CT 12/21 showed subcutaneous edema along the dorsal aspect of the hand suggesting cellulitis; no other abnormality is seen with no evidence of an abscess or osteomyelitis (though noted MRI better to evaluate bone involvement). Seen by Ortho 12/21 and no surgery indicated at this point.  * Micro: BC's 12/20 NGTD.  - Continue cefazolin (started 12/21) and vanco (started 12/20).  - Monitor cultures (NGTD)  - Monitor renal function while patient on IV vancomycin.  - Orhto is following. Appreciate help from Ortho. No need for surgical intervention at this time.  - ID consulted, appreciate help.    Recent Labs   Lab 12/22/18  0625 12/21/18  0700 12/20/18  1846 12/19/18  1123   WBC 12.0* 12.7* 17.8* 16.8*         Diet: Regular Diet Adult    Prophylaxis: PCD's, ambulation.   Villavicencio Catheter: not present  Code Status: Full Code      Disposition Plan   Expected discharge: 1-2d, recommended to prior living arrangement once cellulitis improved and can transition to PO antibiotics.    Entered: Rola Cueva MD 12/22/2018, 9:12 AM       Interval History   Patient is doing well. His hand swelling/redness is improving. He denies fever, chill,s n/v/abd pain/diarrhea.    -Data reviewed today: I reviewed all new " "labs and imaging over the last 24 hours. I personally reviewed no images or EKG's today.    Physical Exam   Heart Rate: 69, Blood pressure 110/67, pulse 71, temperature 97.6  F (36.4  C), temperature source Oral, resp. rate 16, height 1.727 m (5' 8\"), weight 77.6 kg (171 lb), SpO2 97 %.  Vitals:    12/20/18 2036   Weight: 77.6 kg (171 lb)     Vital Signs with Ranges  Temp:  [97.5  F (36.4  C)-98.2  F (36.8  C)] 97.6  F (36.4  C)  Pulse:  [71-84] 71  Heart Rate:  [63-69] 69  Resp:  [16] 16  BP: (110-121)/(67-79) 110/67  SpO2:  [95 %-99 %] 97 %  Patient Vitals for the past 24 hrs:   BP Temp Temp src Pulse Heart Rate Resp SpO2   12/22/18 0740 110/67 97.6  F (36.4  C) Oral -- 69 16 97 %   12/22/18 0026 115/68 98  F (36.7  C) Oral 71 -- 16 95 %   12/21/18 2000 115/75 98  F (36.7  C) Oral 78 -- 16 96 %   12/21/18 1552 121/79 98.2  F (36.8  C) Oral 84 -- 16 95 %   12/21/18 1156 117/75 97.5  F (36.4  C) Oral -- 63 16 99 %     I/O's Last 24 hours  I/O last 3 completed shifts:  In: 1548.33 [P.O.:400; I.V.:1148.33]  Out: -     Constitutional: Alert, oriented, pleasant.  Respiratory: Clear, no crackles/wheezes.  Cardiovascular: RRR no m/r/g.  GI:   Skin/Integumen: Marked swelling and erythema of right hand/distal forearm.  Other:      R hand and forearm 12/21        Data   Recent Labs   Lab 12/22/18  0625 12/21/18  0700 12/20/18  2111 12/20/18  1846   WBC 12.0* 12.7*  --  17.8*   HGB 14.4 14.0  --  14.8   MCV 92 92  --  94    218  --  260    143 141  --    POTASSIUM 4.2 4.1 4.0  --    CHLORIDE 108 110* 106  --    CO2 26 26 30  --    BUN 13 16 17  --    CR 0.82 0.87 0.87  --    ANIONGAP 7 7 5  --    CAMI 8.9 8.9 9.1  --    * 90 96  --      Recent Labs   Lab Test 12/22/18  0625 12/21/18  0700 12/20/18  2111 11/16/15  0943 09/15/11  0915   * 90 96 92 90         No results found for this or any previous visit (from the past 24 hour(s)).    Medications   All medications were reviewed.    sodium chloride 100 " mL/hr at 12/21/18 0131       ceFAZolin  1 g Intravenous Q8H     senna-docusate  1 tablet Oral BID    Or     senna-docusate  2 tablet Oral BID     vancomycin (VANCOCIN) IV  1,250 mg Intravenous Q12H

## 2018-12-22 NOTE — PLAN OF CARE
A/Ox4. VSS on RA. CMS intact. Redness on hand improving. Pt has mild pain in RUE - managed with Tylenol and ice packs. Pt up independently voiding in BR. Pt tolerating regular diet. IV abx. Plan to discharge 1-2 days pending progress. Will continue to monitor.

## 2018-12-22 NOTE — PLAN OF CARE
A&Ox4. VSS. CMS intact ex redness, edema, heat to RUE arm/hand, edges marked and improving per pt report. Tolerating regular diet, NPO at midnight. IV SL, IV abx given x1. Denies pain, Norco available. Ambulating IND to BR, adequate UOP.  Pt varies elevation of RUE and applies ice intermittently. Discharge pending. Continue to monitor.
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
## 2018-12-22 NOTE — PROGRESS NOTES
Patient with markedly improved erythema and swelling.   Continue ice, compression with ACE and skyhook elevation.   IV ABX per hospitalist.   Patient can have diet today. No surgery indicated at this time.   Pain control.   Call for any questions or concerns.       Alejandra Garcia PA-C on 12/22/2018 at 11:13 AM  Orthopedics

## 2018-12-22 NOTE — PLAN OF CARE
A&OX4, VSS on RA. Denies pain. Redness on the right hand has not exceeded the marked border, still red and dallin. On IV abx. Ice applied and elevated on pillow. Up independently in the room. On regular diet. IV saline locked. NPO after midnight for possible surgical intervention tomorrow. CMS otherwise intact. Continue to monitor.

## 2018-12-22 NOTE — PLAN OF CARE
Pt A/O x4. CMS intact. Redness on hand improving. VSS on RA. Up independently; voiding in BR. IV SL. Declines any medication for pain; elevation and ice sufficient. NPO since 0000 in case of need for surgery. Continue to monitor.

## 2018-12-23 VITALS
HEIGHT: 68 IN | DIASTOLIC BLOOD PRESSURE: 81 MMHG | TEMPERATURE: 97.5 F | SYSTOLIC BLOOD PRESSURE: 126 MMHG | WEIGHT: 171 LBS | HEART RATE: 66 BPM | RESPIRATION RATE: 16 BRPM | BODY MASS INDEX: 25.91 KG/M2 | OXYGEN SATURATION: 97 %

## 2018-12-23 LAB
CREAT SERPL-MCNC: 0.84 MG/DL (ref 0.66–1.25)
ERYTHROCYTE [DISTWIDTH] IN BLOOD BY AUTOMATED COUNT: 12.3 % (ref 10–15)
GFR SERPL CREATININE-BSD FRML MDRD: >90 ML/MIN/{1.73_M2}
HCT VFR BLD AUTO: 40.1 % (ref 40–53)
HGB BLD-MCNC: 14 G/DL (ref 13.3–17.7)
MCH RBC QN AUTO: 31.5 PG (ref 26.5–33)
MCHC RBC AUTO-ENTMCNC: 34.9 G/DL (ref 31.5–36.5)
MCV RBC AUTO: 90 FL (ref 78–100)
PLATELET # BLD AUTO: 235 10E9/L (ref 150–450)
RBC # BLD AUTO: 4.44 10E12/L (ref 4.4–5.9)
WBC # BLD AUTO: 9.5 10E9/L (ref 4–11)

## 2018-12-23 PROCEDURE — 25000132 ZZH RX MED GY IP 250 OP 250 PS 637: Performed by: INTERNAL MEDICINE

## 2018-12-23 PROCEDURE — 25000125 ZZHC RX 250: Performed by: INTERNAL MEDICINE

## 2018-12-23 PROCEDURE — 99239 HOSP IP/OBS DSCHRG MGMT >30: CPT | Performed by: INTERNAL MEDICINE

## 2018-12-23 PROCEDURE — 25000128 H RX IP 250 OP 636: Performed by: INTERNAL MEDICINE

## 2018-12-23 PROCEDURE — 82565 ASSAY OF CREATININE: CPT | Performed by: INTERNAL MEDICINE

## 2018-12-23 PROCEDURE — 36415 COLL VENOUS BLD VENIPUNCTURE: CPT | Performed by: INTERNAL MEDICINE

## 2018-12-23 PROCEDURE — 85027 COMPLETE CBC AUTOMATED: CPT | Performed by: INTERNAL MEDICINE

## 2018-12-23 RX ORDER — SACCHAROMYCES BOULARDII 250 MG
250 CAPSULE ORAL 2 TIMES DAILY
Qty: 60 CAPSULE | Refills: 0 | Status: SHIPPED | OUTPATIENT
Start: 2018-12-23 | End: 2018-12-23

## 2018-12-23 RX ORDER — CLINDAMYCIN HCL 150 MG
300 CAPSULE ORAL 3 TIMES DAILY
Qty: 60 CAPSULE | Refills: 0
Start: 2018-12-23 | End: 2019-01-02

## 2018-12-23 RX ORDER — CEPHALEXIN 500 MG/1
500 CAPSULE ORAL 3 TIMES DAILY
Qty: 30 CAPSULE | Refills: 0 | Status: SHIPPED | OUTPATIENT
Start: 2018-12-23 | End: 2019-01-02

## 2018-12-23 RX ORDER — SACCHAROMYCES BOULARDII 250 MG
250 CAPSULE ORAL 2 TIMES DAILY
Qty: 28 CAPSULE | Refills: 0 | Status: SHIPPED | OUTPATIENT
Start: 2018-12-23 | End: 2019-01-06

## 2018-12-23 RX ORDER — SACCHAROMYCES BOULARDII 250 MG
250 CAPSULE ORAL 2 TIMES DAILY
Status: DISCONTINUED | OUTPATIENT
Start: 2018-12-23 | End: 2018-12-23 | Stop reason: HOSPADM

## 2018-12-23 RX ADMIN — Medication 250 MG: at 14:09

## 2018-12-23 RX ADMIN — ACETAMINOPHEN 650 MG: 325 TABLET, FILM COATED ORAL at 08:38

## 2018-12-23 RX ADMIN — CLINDAMYCIN IN 5 PERCENT DEXTROSE 900 MG: 18 INJECTION, SOLUTION INTRAVENOUS at 04:12

## 2018-12-23 RX ADMIN — CEFAZOLIN SODIUM 2 G: 2 INJECTION, SOLUTION INTRAVENOUS at 14:05

## 2018-12-23 RX ADMIN — CLINDAMYCIN IN 5 PERCENT DEXTROSE 900 MG: 18 INJECTION, SOLUTION INTRAVENOUS at 12:32

## 2018-12-23 RX ADMIN — CEFAZOLIN SODIUM 2 G: 2 INJECTION, SOLUTION INTRAVENOUS at 08:38

## 2018-12-23 ASSESSMENT — ACTIVITIES OF DAILY LIVING (ADL)
ADLS_ACUITY_SCORE: 12
ADLS_ACUITY_SCORE: 10

## 2018-12-23 NOTE — PLAN OF CARE
A/Ox4. VSS on RA. CMS intact. Redness on hand improving. Pt has mild pain in RUE - managed with Tylenol and ice packs. Pt up independently voiding in BR. Pt tolerating regular diet. IV abx. Pt to discharge home with transportation provided by wife. Continue PO abx at home. Discharge instructions reviewed with pt and wife. Pt discharging with belongings. Questions answered. Pt to follow-up with PCP in one week.

## 2018-12-23 NOTE — PLAN OF CARE
Pt independent in room, cellulitis  improving, VSS, CMS intact. Continue to monitor.on iv antibiotics

## 2018-12-23 NOTE — PROGRESS NOTES
"[facility]    Internal Medicine Hospitalist Progress Note  12/23/2018  I evaluated patient on the above date.    Assessment & Plan   Duran Garner is a 58 year old male without prior diagnosed medical problems, who presented 12/21 with right hand pain and swelling and found with evidence of cellulitis.     R hand and forearm cellulitis, improving.  * Had \"pimple\" on his R 3rd finger about 4d PTA; he tried to pop it, no discharge/drainage; 2d later, increased erythema which spread to wrist. Seen in clinic 12/19 and hand x-rays negative for fracture; given dose of IM ceftriaxone and started on PO clindamycin.   * Symptoms worsened and presented to [facility] 12/20. Started on vanco and cefazolin upon admit. Hand CT 12/21 showed subcutaneous edema along the dorsal aspect of the hand suggesting cellulitis; no other abnormality is seen with no evidence of an abscess or osteomyelitis (though noted MRI better to evaluate bone involvement). Seen by Ortho 12/21 and no surgery indicated at this point.  * Seen by Dr. George of ID and antibiotic regimen changed to IV cefazolin and clindamycin from 12/22  * Started on probiotics 12/23  * Micro: BC's 12/20 NGTD.    - Continue cefazolin (started 12/21) and clindamycin (started 12/22)  - Continue Florastor  - Monitor cultures (NGTD)  - Follow up on Nasal swab MRSA  - Keep hand elevated  - Orhto is following. Appreciate help from Ortho. No need for surgical intervention at this time.  - ID consulted, appreciate help.    Recent Labs   Lab 12/23/18  0654 12/22/18  0625 12/21/18  0700 12/20/18  1846 12/19/18  1123   WBC 9.5 12.0* 12.7* 17.8* 16.8*       Diet: Regular Diet Adult    Prophylaxis: PCD's, ambulation.   Villavicencio Catheter: not present  Code Status: Full Code      Disposition Plan   Expected discharge: 1-2d, recommended to prior living arrangement once cellulitis improved and can transition to PO antibiotics.    Entered: Rola Cueva MD 12/23/2018, 10:16 AM   " "    Interval History   Patient is doing well. His hand swelling/redness is improving. He denies fever, chill,s n/v/abd pain/diarrhea.    -Data reviewed today: I reviewed all new labs and imaging over the last 24 hours. I personally reviewed no images or EKG's today.    Physical Exam   Heart Rate: 65, Blood pressure 122/76, pulse 66, temperature 97.8  F (36.6  C), temperature source Oral, resp. rate 16, height 1.727 m (5' 8\"), weight 77.6 kg (171 lb), SpO2 97 %.  Vitals:    12/20/18 2036   Weight: 77.6 kg (171 lb)     Vital Signs with Ranges  Temp:  [97.6  F (36.4  C)-98  F (36.7  C)] 97.8  F (36.6  C)  Pulse:  [66-68] 66  Heart Rate:  [62-77] 65  Resp:  [16] 16  BP: (117-128)/(76-88) 122/76  SpO2:  [97 %-99 %] 97 %  Patient Vitals for the past 24 hrs:   BP Temp Temp src Pulse Heart Rate Resp SpO2   12/23/18 0750 122/76 97.8  F (36.6  C) Oral -- 65 16 97 %   12/23/18 0000 125/86 98  F (36.7  C) Oral 66 62 16 98 %   12/22/18 2019 128/88 97.6  F (36.4  C) Oral 68 67 16 98 %   12/22/18 1616 125/79 97.9  F (36.6  C) Oral 66 65 16 97 %   12/22/18 1300 117/79 97.8  F (36.6  C) Oral -- 77 16 99 %     I/O's Last 24 hours  I/O last 3 completed shifts:  In: 840 [P.O.:840]  Out: 1 [Emesis/NG output:1]    Constitutional: Alert, oriented, pleasant.  Respiratory: Clear, no crackles/wheezes.  Cardiovascular: RRR no m/r/g.  GI:   Skin/Integumen: Decreased swelling and erythema of right hand/distal forearm.  Other:        Data   Recent Labs   Lab 12/23/18  0654 12/22/18  0854 12/22/18  0625 12/21/18  0700 12/20/18  2111   WBC 9.5  --  12.0* 12.7*  --    HGB 14.0  --  14.4 14.0  --    MCV 90  --  92 92  --      --  237 218  --    NA  --   --  141 143 141   POTASSIUM  --   --  4.2 4.1 4.0   CHLORIDE  --   --  108 110* 106   CO2  --   --  26 26 30   BUN  --   --  13 16 17   CR 0.84 0.76 0.82 0.87 0.87   ANIONGAP  --   --  7 7 5   CAMI  --   --  8.9 8.9 9.1   GLC  --   --  100* 90 96     Recent Labs   Lab Test 12/22/18  0625 " 12/21/18  0700 12/20/18  2111 11/16/15  0943 09/15/11  0915   * 90 96 92 90         No results found for this or any previous visit (from the past 24 hour(s)).    Medications   All medications were reviewed.    sodium chloride Stopped (12/22/18 1045)       ceFAZolin  2 g Intravenous Q8H     clindamycin  900 mg Intravenous Q8H     senna-docusate  1 tablet Oral BID    Or     senna-docusate  2 tablet Oral BID

## 2018-12-23 NOTE — PROGRESS NOTES
[facility]    Infectious Disease Progress Note    Date of Service (when I saw the patient): 12/23/2018     Assessment & Plan   Duran Garner is a 58 year old male who was admitted on 12/20/2018.     Impression:  1. 58 y.o who noticed a  small red area on his middle finger that went on to develop almost a small pimple. This was popped 4 nights PTA and after this his hand and forearm became red, swollen, and painful.   2. Admitted with IV vanco and ancef.   3. Seen by Ortho no interventions for now.   4. Symptoms, fevers and wbc all improving.      Recommendations:   Continue Ancef and clinda IV till this evening.    Keep the extremity elevated.   Oral Keflex 500mg TID  and clindamycin 300 mg TID for 10 more days this evening.             Tiffany George MD    Interval History   Afebrile   Wbc normalized     Physical Exam   Temp: 97.8  F (36.6  C) Temp src: Oral BP: 122/76 Pulse: 66 Heart Rate: 65 Resp: 16 SpO2: 97 % O2 Device: None (Room air)    Vitals:    12/20/18 2036   Weight: 77.6 kg (171 lb)     Vital Signs with Ranges  Temp:  [97.6  F (36.4  C)-98  F (36.7  C)] 97.8  F (36.6  C)  Pulse:  [66-68] 66  Heart Rate:  [62-77] 65  Resp:  [16] 16  BP: (117-128)/(76-88) 122/76  SpO2:  [97 %-99 %] 97 %    Constitutional: Awake, alert, cooperative, no apparent distress  Lungs: Clear to auscultation bilaterally, no crackles or wheezing  Cardiovascular: Regular rate and rhythm, normal S1 and S2, and no murmur noted  Abdomen: Normal bowel sounds, soft, non-distended, non-tender  Skin: No rashes, no cyanosis, no edema  Other:    Medications     sodium chloride Stopped (12/22/18 1045)       ceFAZolin  2 g Intravenous Q8H     clindamycin  900 mg Intravenous Q8H     saccharomyces boulardii  250 mg Oral BID     senna-docusate  1 tablet Oral BID    Or     senna-docusate  2 tablet Oral BID       Data   All microbiology laboratory data reviewed.  Recent Labs   Lab Test 12/23/18  0654 12/22/18  0625 12/21/18  0700    WBC 9.5 12.0* 12.7*   HGB 14.0 14.4 14.0   HCT 40.1 41.0 40.3   MCV 90 92 92    237 218     Recent Labs   Lab Test 12/23/18  0654 12/22/18  0854 12/22/18  0625   CR 0.84 0.76 0.82     No lab results found.  Recent Labs   Lab Test 12/20/18  2131 12/20/18  2111   CULT No growth after 3 days No growth after 3 days

## 2018-12-23 NOTE — PLAN OF CARE
A&OX4, VSS on RA. Denies pain. Up independently in the room. On regular diet. Cellulitis on the Right hand markedly improved from yesterday. Elevated on pillows and ice applied. Plan to transition to PO abx. Continue to monitor.

## 2018-12-23 NOTE — DISCHARGE SUMMARY
"[facility]  Discharge Summary        Duran Garner MRN# [id number]   YOB: 1960 Age: 58 year old     Date of Admission:  12/20/2018  Date of Discharge:  12/23/2018  Admitting Physician:  Norma Garzon MD  Discharge Physician:             Rola Cueva MD  Discharging Service:             Hospitalist     Primary Provider:  Usama Interiano PA-C  Primary Care Physician Phone Number: [phone]     Discharge Diagnoses:     Right hand and forearm cellulitis    Problem Oriented Hospital Course   Duran Garner is a 58 year old male without prior diagnosed medical problems, who presented 12/21 with right hand pain and swelling and found with evidence of cellulitis. He was admitted for further management.    For a detailed history and physical exam, please refer to the dictated admission note by Dr. Duran Green on 12/21/2018.     R hand and forearm cellulitis.  Patient had \"pimple\" on his R 3rd finger about 4 days PTA; he tried to pop it, no discharge/drainage; 2 days later, increased erythema which spread to wrist. Seen in clinic 12/19 and hand x-rays negative for fracture; given dose of IM ceftriaxone and started on PO clindamycin. Symptoms worsened and presented to [facility] 12/20. Started on vanco and cefazolin upon admit. Hand CT 12/21 showed subcutaneous edema along the dorsal aspect of the hand suggesting cellulitis; no other abnormality was seen with no evidence of an abscess or osteomyelitis. Patient was evaluated by Ortho 12/21 and no surgery was indicated. He was also seen by Dr. George of ID and antibiotic regimen changed to IV cefazolin and clindamycin from 12/22. He continued with IV antibiotic therapy till discharge, then changed to cephalexin 500 mg po tid and clindamycin 300 mg po tid for 10 days. He was also started on probiotics for 14 days. Notably blood cultures from 2/20 remained negative. He had a satisfactory response to treatment and showed marked " decrease in swelling and erythema. He was instructed to keep his hand/forearm elevated.           Code Status:      Full Code        Brief Hospital Stay Summary Sent Home With Patient in AVS:        Reason for your hospital stay      Right hand cellulitis                 Important Results:      Na 141, K 4.2, Cr 0.84, Hgb 14, Plts 235K, WBC 9.5        Pending Results:        Unresulted Labs Ordered in the Past 30 Days of this Admission     Date and Time Order Name Status Description    12/20/2018 2058 Blood culture Preliminary     12/20/2018 2058 Blood culture Preliminary               Discharge Instructions and Follow-Up:      Follow-up Appointments     Follow-up and recommended labs and tests       Follow up with PCP in one week.                 Discharge Disposition:      Discharged to home        Discharge Medications:        Current Discharge Medication List      START taking these medications    Details   cephALEXin (KEFLEX) 500 MG capsule Take 1 capsule (500 mg) by mouth 3 times daily for 10 days  Qty: 30 capsule, Refills: 0    Associated Diagnoses: Cellulitis of hand      saccharomyces boulardii (FLORASTOR) 250 MG capsule Take 1 capsule (250 mg) by mouth 2 times daily for 14 days  Qty: 28 capsule, Refills: 0    Associated Diagnoses: Cellulitis of hand         CONTINUE these medications which have CHANGED    Details   clindamycin (CLEOCIN) 150 MG capsule Take 2 capsules (300 mg) by mouth 3 times daily for 10 days  Qty: 60 capsule, Refills: 0    Associated Diagnoses: Cellulitis of right upper extremity                 Allergies:         Allergies   Allergen Reactions     Ibuprofen Hives     Sulfa Drugs Hives     Ibuprofen Sodium      Levaquin [Levofloxacin Hemihydrate]      Hives     Levofloxacin      Hives     Penicillins      As a child           Consultations This Hospital Stay:      Orthopedics  Infectious Disease        Condition and Physical on Discharge:      Discharge condition: Stable   Vitals: Blood  "pressure 126/81, pulse 66, temperature 97.5  F (36.4  C), temperature source Oral, resp. rate 16, height 1.727 m (5' 8\"), weight 77.6 kg (171 lb), SpO2 97 %.           Diet and Activity:     After Care Instructions     Activity      Your activity upon discharge: activity as tolerated         Diet      Follow this diet upon discharge: Orders Placed This Encounter      Regular Diet Adult                     Discharge Time:      Greater than 30 minutes.        Image Results From This Hospital Stay (For Non-EPIC Providers):        Results for orders placed or performed during the hospital encounter of 12/20/18   CT Hand Right w/o Contrast    Narrative    CT RIGHT HAND WITHOUT CONTRAST   12/21/2018 9:07 AM    HISTORY: Hand swelling. Suspected infection.    COMPARISON: Radiographs on 12/19/2018.    TECHNIQUE: Routine CT imaging is performed through the right hand  without contrast. Radiation dose for this scan was reduced using  automated exposure control, adjustment of the mA and/or kV according  to patient size, or iterative reconstruction technique. Markers were  placed over the sites of clinical concern dorsally.    FINDINGS: No fracture or osseous lesion is demonstrated. No  significant joint space pathology is demonstrated. There appears to be  edema in the subcutaneous tissues along the dorsal aspect of the hand  with no distinct focal mass identified. No other abnormality is seen.      Impression    IMPRESSION: Subcutaneous edema along the dorsal aspect of the hand  suggesting cellulitis. No other abnormality is seen with no evidence  of an abscess or osteomyelitis. Note that CT is not ideal for the  evaluation of bone infection. If there is clinical concern for  osteomyelitis and further imaging is needed, an MRI could be  considered. If contraindicated, a bone scan could also be utilized.    DANILO LIN MD     No results found for this or any previous visit (from the past 4320 hour(s)).        Most " Recent Lab Results In EPIC (For Non-EPIC Providers):    Most Recent 3 CBC's:  Recent Labs   Lab Test 12/23/18  0654 12/22/18  0625 12/21/18  0700   WBC 9.5 12.0* 12.7*   HGB 14.0 14.4 14.0   MCV 90 92 92    237 218      Most Recent 3 BMP's:  Recent Labs   Lab Test 12/23/18  0654 12/22/18  0854 12/22/18  0625 12/21/18  0700 12/20/18  2111   NA  --   --  141 143 141   POTASSIUM  --   --  4.2 4.1 4.0   CHLORIDE  --   --  108 110* 106   CO2  --   --  26 26 30   BUN  --   --  13 16 17   CR 0.84 0.76 0.82 0.87 0.87   ANIONGAP  --   --  7 7 5   CAMI  --   --  8.9 8.9 9.1   GLC  --   --  100* 90 96     Most Recent 3 Troponin's:No lab results found.    Invalid input(s): TROP, TROPONINIES  Most Recent 3 INR's:No lab results found.  Most Recent 2 LFT's:  Recent Labs   Lab Test 09/15/11  0915   AST 32   ALT 33   ALKPHOS 84   BILITOTAL 0.5     Most Recent Cholesterol Panel:  Recent Labs   Lab Test 11/16/15  0943   CHOL 183   LDL 99   HDL 61   TRIG 114     Most Recent 6 Bacteria Isolates From Any Culture (See EPIC Reports for Culture Details):  Recent Labs   Lab Test 12/20/18  2131 12/20/18  2111   CULT No growth after 3 days No growth after 3 days     Most Recent TSH, T4 and HgbA1c:   Recent Labs   Lab Test 09/15/11  0915   TSH 1.46

## 2018-12-24 ENCOUNTER — TELEPHONE (OUTPATIENT)
Dept: FAMILY MEDICINE | Facility: CLINIC | Age: 58
End: 2018-12-24

## 2018-12-24 NOTE — TELEPHONE ENCOUNTER
Please contact patient for In-patient follow up.  There are no phone numbers on file.    Visit date: 12/23/2018  Diagnosis listed:Cellulitis Of Hand  Number of visits in past 12 months:0/1

## 2018-12-26 LAB
BACTERIA SPEC CULT: NO GROWTH
BACTERIA SPEC CULT: NO GROWTH
Lab: NORMAL
Lab: NORMAL
SPECIMEN SOURCE: NORMAL
SPECIMEN SOURCE: NORMAL

## 2018-12-26 NOTE — TELEPHONE ENCOUNTER
"ED / Discharge Outreach Protocol    Patient Contact    Attempt # 1    Was call answered?  Yes.  \"May I please speak with Efrain?  Is patient available?   Yes    Hospital/TCU/ED for chronic condition Discharge Protocol    \"Hi, my name is Kat Parikh, a registered nurse, and I am calling from [facility].  I am calling to follow up and see how things are going for you after your recent emergency visit/hospital/TCU stay.\"    Tell me how you are doing now that you are home?\" Good      Discharge Instructions    \"Let's review your discharge instructions.  What is/are the follow-up recommendations?  Pt. Response: f/u w/ PCP- scheduled.     \"Has an appointment with your primary care provider been scheduled?\"   No (schedule appointment)    \"When you see the provider, I would recommend that you bring your medications with you.\"    Medications    \"Tell me what changed about your medicines when you discharged?\"    Changes to chronic meds?    0-1    \"What questions do you have about your medications?\"    None     New diagnoses of heart failure, COPD, diabetes, or MI?    No              Medication reconciliation completed? Yes  Was MTM referral placed (*Make sure to put transitions as reason for referral)?   No    Call Summary    \"What questions or concerns do you have about your recent visit and your follow-up care?\"     none    \"If you have questions or things don't continue to improve, we encourage you contact us through the main clinic number (give number).  Even if the clinic is not open, triage nurses are available 24/7 to help you.     We would like you to know that our clinic has extended hours (provide information).  We also have urgent care (provide details on closest location and hours/contact info)\"      \"Thank you for your time and take care!\"    Kat MAYES RN            "

## 2018-12-31 ENCOUNTER — OFFICE VISIT (OUTPATIENT)
Dept: FAMILY MEDICINE | Facility: CLINIC | Age: 58
End: 2018-12-31
Payer: COMMERCIAL

## 2018-12-31 VITALS
BODY MASS INDEX: 27.61 KG/M2 | HEIGHT: 68 IN | HEART RATE: 78 BPM | SYSTOLIC BLOOD PRESSURE: 115 MMHG | OXYGEN SATURATION: 97 % | TEMPERATURE: 97.5 F | WEIGHT: 182.2 LBS | RESPIRATION RATE: 14 BRPM | DIASTOLIC BLOOD PRESSURE: 80 MMHG

## 2018-12-31 DIAGNOSIS — L03.113 CELLULITIS OF HAND, RIGHT: Primary | ICD-10-CM

## 2018-12-31 PROCEDURE — 99495 TRANSJ CARE MGMT MOD F2F 14D: CPT | Performed by: PHYSICIAN ASSISTANT

## 2018-12-31 ASSESSMENT — MIFFLIN-ST. JEOR: SCORE: 1630.95

## 2018-12-31 NOTE — NURSING NOTE
Last colonoscopy done 10 years ago. Has had a FIT test since, will be due 1/2019, will get done at physical.
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
## 2019-06-17 PROBLEM — I83.811 VARICOSE VEINS OF RIGHT LOWER EXTREMITY WITH PAIN: Status: ACTIVE | Noted: 2017-09-15

## 2019-09-29 ENCOUNTER — HEALTH MAINTENANCE LETTER (OUTPATIENT)
Age: 59
End: 2019-09-29

## 2019-10-04 NOTE — PROGRESS NOTES
SUBJECTIVE:   CC: Duran Garner is an 59 year old male who presents for preventative health visit.     Healthy Habits:     Getting at least 3 servings of Calcium per day:  Yes    Bi-annual eye exam:  Yes    Dental care twice a year:  Yes    Sleep apnea or symptoms of sleep apnea:  None    Diet:  Regular (no restrictions)    Frequency of exercise:  4-5 days/week    Duration of exercise:  30-45 minutes    Taking medications regularly:  Yes    Medication side effects:  None    PHQ-2 Total Score: 0    Additional concerns today:  No    Today's PHQ-2 Score:   PHQ-2 ( 1999 Pfizer) 10/7/2019   Q1: Little interest or pleasure in doing things 0   Q2: Feeling down, depressed or hopeless 0   PHQ-2 Score 0   Q1: Little interest or pleasure in doing things Not at all   Q2: Feeling down, depressed or hopeless Not at all   PHQ-2 Score 0       Abuse: Current or Past(Physical, Sexual or Emotional)- No  Do you feel safe in your environment? Yes    Social History     Tobacco Use     Smoking status: Never Smoker     Smokeless tobacco: Never Used   Substance Use Topics     Alcohol use: Yes     Alcohol/week: 0.0 - 0.8 standard drinks         Alcohol Use 10/7/2019   Prescreen: >3 drinks/day or >7 drinks/week? No   Prescreen: >3 drinks/day or >7 drinks/week? -       Last PSA:   PSA   Date Value Ref Range Status   11/16/2015 0.49 0 - 4 ug/L Final       Reviewed orders with patient. Reviewed health maintenance and updated orders accordingly - Yes  Labs reviewed in EPIC    Reviewed and updated as needed this visit by clinical staff  Tobacco  Allergies  Meds  Med Hx  Surg Hx  Fam Hx  Soc Hx        Reviewed and updated as needed this visit by Provider            Review of Systems   Constitutional: Negative for chills and fever.   HENT: Negative for congestion, ear pain and hearing loss.    Eyes: Negative for pain.   Respiratory: Negative for cough.    Cardiovascular: Negative for chest pain and peripheral edema.   Gastrointestinal:  "Negative for abdominal pain, constipation, diarrhea, heartburn and hematochezia.   Genitourinary: Negative for frequency, genital sores and hematuria.   Musculoskeletal: Negative for arthralgias, joint swelling and myalgias.   Neurological: Negative for dizziness and headaches.   Psychiatric/Behavioral: Negative for mood changes. The patient is not nervous/anxious.        OBJECTIVE:   /64   Pulse 64   Temp 97.8  F (36.6  C) (Tympanic)   Resp 16   Ht 1.727 m (5' 8\")   Wt 77.4 kg (170 lb 11.2 oz)   SpO2 97%   BMI 25.95 kg/m      Physical Exam  GENERAL: healthy, alert and no distress  EYES: Eyes grossly normal to inspection, PERRL and conjunctivae and sclerae normal  HENT: ear canals and TM's normal, nose and mouth without ulcers or lesions  NECK: no adenopathy, no asymmetry, masses, or scars and thyroid normal to palpation  RESP: lungs clear to auscultation - no rales, rhonchi or wheezes  CV: regular rate and rhythm, normal S1 S2, no S3 or S4, no murmur, click or rub, no peripheral edema and peripheral pulses strong  ABDOMEN: soft, nontender, no hepatosplenomegaly, no masses and bowel sounds normal   (male): normal male genitalia without lesions or urethral discharge, no hernia  MS: no gross musculoskeletal defects noted, no edema; there is a palpable cord/varicosity along the posterior right leg up into the leg. Mildly tender  BACK: no CVA tenderness, no paralumbar tenderness  PSYCH: mentation appears normal, affect normal/bright    Diagnostic Test Results:  See A/P    ASSESSMENT/PLAN:   1. Routine general medical examination at a health care facility  Reviewed personal and family history. Reviewed age appropriate screenings. Recommended any needed vaccinations.    2. Varicose veins of right lower extremities with pain  Referring again to vascular. These have improved some. He is less interested in stripping vs alternative therapies as they are again more painful  - VASCULAR MEDICINE REFERRAL; " "Future    3. Screening for prostate cancer  - Prostate spec antigen screen    4. Need for prophylactic vaccination and inoculation against influenza  - INFLUENZA QUAD, RECOMBINANT, P-FREE (RIV4) (FLUBLOCK) [64328]  - Vaccine Administration, Initial [06154]      5. Special screening for malignant neoplasms, colon  - Fecal colorectal cancer screen (FIT); Future    COUNSELING:   Reviewed preventive health counseling, as reflected in patient instructions    Estimated body mass index is 25.95 kg/m  as calculated from the following:    Height as of this encounter: 1.727 m (5' 8\").    Weight as of this encounter: 77.4 kg (170 lb 11.2 oz).     Weight management plan: Discussed healthy diet and exercise guidelines     reports that he has never smoked. He has never used smokeless tobacco.      Counseling Resources:  ATP IV Guidelines  Pooled Cohorts Equation Calculator  FRAX Risk Assessment  ICSI Preventive Guidelines  Dietary Guidelines for Americans, 2010  USDA's MyPlate  ASA Prophylaxis  Lung CA Screening    Usama Interiano PA-C  [facility]  "

## 2019-10-04 NOTE — PATIENT INSTRUCTIONS
[facility]      Preventive Health Recommendations  Male Ages 50 - 64    Yearly exam:             See your health care provider every year in order to  o   Review health changes.   o   Discuss preventive care.    o   Review your medicines if your doctor has prescribed any.     Have a cholesterol test every 5 years, or more frequently if you are at risk for high cholesterol/heart disease.     Have a diabetes test (fasting glucose) every three years. If you are at risk for diabetes, you should have this test more often.     Have a colonoscopy at age 50, or have a yearly FIT test (stool test). These exams will check for colon cancer.      Talk with your health care provider about whether or not a prostate cancer screening test (PSA) is right for you.    You should be tested each year for STDs (sexually transmitted diseases), if you re at risk.     Shots: Get a flu shot each year. Get a tetanus shot every 10 years.     Nutrition:    Eat at least 5 servings of fruits and vegetables daily.     Eat whole-grain bread, whole-wheat pasta and brown rice instead of white grains and rice.     Get adequate Calcium and Vitamin D.     Lifestyle    Exercise for at least 150 minutes a week (30 minutes a day, 5 days a week). This will help you control your weight and prevent disease.     Limit alcohol to one drink per day.     No smoking.     Wear sunscreen to prevent skin cancer.     See your dentist every six months for an exam and cleaning.     See your eye doctor every 1 to 2 years.

## 2019-10-07 ENCOUNTER — OFFICE VISIT (OUTPATIENT)
Dept: FAMILY MEDICINE | Facility: CLINIC | Age: 59
End: 2019-10-07
Payer: COMMERCIAL

## 2019-10-07 ENCOUNTER — TELEPHONE (OUTPATIENT)
Dept: OTHER | Facility: CLINIC | Age: 59
End: 2019-10-07

## 2019-10-07 VITALS
TEMPERATURE: 97.8 F | DIASTOLIC BLOOD PRESSURE: 64 MMHG | SYSTOLIC BLOOD PRESSURE: 101 MMHG | RESPIRATION RATE: 16 BRPM | HEART RATE: 64 BPM | BODY MASS INDEX: 25.87 KG/M2 | HEIGHT: 68 IN | OXYGEN SATURATION: 97 % | WEIGHT: 170.7 LBS

## 2019-10-07 DIAGNOSIS — Z12.5 SCREENING FOR PROSTATE CANCER: ICD-10-CM

## 2019-10-07 DIAGNOSIS — I83.811 VARICOSE VEINS OF RIGHT LOWER EXTREMITY WITH PAIN: ICD-10-CM

## 2019-10-07 DIAGNOSIS — Z12.11 SPECIAL SCREENING FOR MALIGNANT NEOPLASMS, COLON: ICD-10-CM

## 2019-10-07 DIAGNOSIS — Z23 NEED FOR PROPHYLACTIC VACCINATION AND INOCULATION AGAINST INFLUENZA: ICD-10-CM

## 2019-10-07 DIAGNOSIS — Z00.00 ROUTINE GENERAL MEDICAL EXAMINATION AT A HEALTH CARE FACILITY: Primary | ICD-10-CM

## 2019-10-07 PROCEDURE — 90682 RIV4 VACC RECOMBINANT DNA IM: CPT | Performed by: PHYSICIAN ASSISTANT

## 2019-10-07 PROCEDURE — 36415 COLL VENOUS BLD VENIPUNCTURE: CPT | Performed by: PHYSICIAN ASSISTANT

## 2019-10-07 PROCEDURE — G0103 PSA SCREENING: HCPCS | Performed by: PHYSICIAN ASSISTANT

## 2019-10-07 PROCEDURE — 99396 PREV VISIT EST AGE 40-64: CPT | Mod: 25 | Performed by: PHYSICIAN ASSISTANT

## 2019-10-07 PROCEDURE — 90471 IMMUNIZATION ADMIN: CPT | Performed by: PHYSICIAN ASSISTANT

## 2019-10-07 ASSESSMENT — ENCOUNTER SYMPTOMS
EYE PAIN: 0
DIARRHEA: 0
ABDOMINAL PAIN: 0
JOINT SWELLING: 0
FREQUENCY: 0
HEMATOCHEZIA: 0
HEARTBURN: 0
HEMATURIA: 0
ARTHRALGIAS: 0
FEVER: 0
DIZZINESS: 0
CONSTIPATION: 0
COUGH: 0
HEADACHES: 0
NERVOUS/ANXIOUS: 0
CHILLS: 0
MYALGIAS: 0

## 2019-10-07 ASSESSMENT — MIFFLIN-ST. JEOR: SCORE: 1573.79

## 2019-10-07 NOTE — TELEPHONE ENCOUNTER
"Referral received via EPIC \"Work Que\", per  guidelines referral forwarded to [facility].     Nancy Tran BSN, RN  [facility]     "

## 2019-10-08 ENCOUNTER — TELEPHONE (OUTPATIENT)
Dept: FAMILY MEDICINE | Facility: CLINIC | Age: 59
End: 2019-10-08

## 2019-10-08 LAB — PSA SERPL-ACNC: 0.51 UG/L (ref 0–4)

## 2019-10-08 NOTE — TELEPHONE ENCOUNTER
Type of outreach:  Discussed HM needs with patient at [facility] on 10/7/19.  Health Maintenance Due   Topic Date Due     ADVANCE CARE PLANNING  1960     HIV SCREENING  01/14/1977     ZOSTER IMMUNIZATION (1 of 2) 01/14/2012     FIT  01/18/2019     Patient was sent home with a FIT test.  Jade Gaines MA

## 2019-10-10 NOTE — TELEPHONE ENCOUNTER
..Rec'vd referral from PCP that pt needs to be seen at [facility] for varicose veins. I clld pt, LM stating I will mail our forms so they can schedule when convenient for them.

## 2019-10-27 ENCOUNTER — TRANSFERRED RECORDS (OUTPATIENT)
Dept: HEALTH INFORMATION MANAGEMENT | Facility: CLINIC | Age: 59
End: 2019-10-27

## 2019-11-22 DIAGNOSIS — Z12.11 SPECIAL SCREENING FOR MALIGNANT NEOPLASMS, COLON: ICD-10-CM

## 2019-11-22 PROCEDURE — 82274 ASSAY TEST FOR BLOOD FECAL: CPT | Performed by: PHYSICIAN ASSISTANT

## 2019-11-28 LAB — HEMOCCULT STL QL IA: NEGATIVE

## 2020-01-03 ENCOUNTER — OFFICE VISIT (OUTPATIENT)
Dept: FAMILY MEDICINE | Facility: CLINIC | Age: 60
End: 2020-01-03
Payer: COMMERCIAL

## 2020-01-03 VITALS
RESPIRATION RATE: 14 BRPM | HEART RATE: 65 BPM | SYSTOLIC BLOOD PRESSURE: 121 MMHG | DIASTOLIC BLOOD PRESSURE: 75 MMHG | TEMPERATURE: 97.4 F | OXYGEN SATURATION: 98 % | HEIGHT: 68 IN | BODY MASS INDEX: 27.63 KG/M2 | WEIGHT: 182.3 LBS

## 2020-01-03 DIAGNOSIS — H61.21 EXCESSIVE CERUMEN IN RIGHT EAR CANAL: Primary | ICD-10-CM

## 2020-01-03 PROCEDURE — 69210 REMOVE IMPACTED EAR WAX UNI: CPT | Performed by: FAMILY MEDICINE

## 2020-01-03 ASSESSMENT — MIFFLIN-ST. JEOR: SCORE: 1626.41

## 2020-01-03 NOTE — PROGRESS NOTES
"Subjective     Duran Garner is a 59 year old male who presents to clinic today for the following health issues:    HPI   Acute Illness   Acute illness concerns: Right Ear   Onset: 10 days put drops in his ear and ear irritated since then     Fever: no     Chills/Sweats: no     Headache (location?): no     Sinus Pressure:no    Conjunctivitis:  no    Ear Pain: - pressure inright ear         Rhinorrhea: no     Congestion: no     Sore Throat: no      Cough: no    Wheeze: no     Decreased Appetite: no    Nausea: no    Vomiting: no    Diarrhea:  no    Dysuria/Freq.: no    Fatigue/Achiness: no    Sick/Strep Exposure: no     Therapies Tried and outcome: none.    His dry wax impinges on the drum edge and likley causes pain           Reviewed and updated as needed this visit by Provider         Review of Systems         Objective    /75 (BP Location: Right arm, Patient Position: Chair, Cuff Size: Adult Regular)   Pulse 65   Temp 97.4  F (36.3  C) (Oral)   Resp 14   Ht 1.727 m (5' 8\")   Wt 82.7 kg (182 lb 4.8 oz)   SpO2 98%   BMI 27.72 kg/m    Body mass index is 27.72 kg/m .  Physical Exam   Lungs clear, no adenopathy, left tm is normal and minimal wax, right is the offending     Diagnostic Test Results:  Labs reviewed in Epic        Assessment & Plan     Successful removal     Ear wax     Romain Garvin MD  [facility]    " Reason specified below

## 2020-01-30 ENCOUNTER — TRANSFERRED RECORDS (OUTPATIENT)
Dept: HEALTH INFORMATION MANAGEMENT | Facility: CLINIC | Age: 60
End: 2020-01-30

## 2020-12-04 ENCOUNTER — TRANSFERRED RECORDS (OUTPATIENT)
Dept: HEALTH INFORMATION MANAGEMENT | Facility: CLINIC | Age: 60
End: 2020-12-04

## 2020-12-29 ASSESSMENT — ENCOUNTER SYMPTOMS
ARTHRALGIAS: 0
SHORTNESS OF BREATH: 0
JOINT SWELLING: 0
FEVER: 0
CONSTIPATION: 0
PALPITATIONS: 0
HEMATOCHEZIA: 0
SORE THROAT: 0
WEAKNESS: 0
NERVOUS/ANXIOUS: 0
DYSURIA: 0
MYALGIAS: 0
PARESTHESIAS: 0
HEARTBURN: 0
FREQUENCY: 0
CHILLS: 0
DIARRHEA: 0
DIZZINESS: 0
NAUSEA: 0
EYE PAIN: 0
ABDOMINAL PAIN: 0
COUGH: 0
HEADACHES: 0
HEMATURIA: 0

## 2020-12-31 ENCOUNTER — TELEPHONE (OUTPATIENT)
Dept: OTHER | Facility: CLINIC | Age: 60
End: 2020-12-31

## 2020-12-31 ENCOUNTER — OFFICE VISIT (OUTPATIENT)
Dept: FAMILY MEDICINE | Facility: CLINIC | Age: 60
End: 2020-12-31
Payer: COMMERCIAL

## 2020-12-31 VITALS
WEIGHT: 182 LBS | BODY MASS INDEX: 26.96 KG/M2 | SYSTOLIC BLOOD PRESSURE: 100 MMHG | HEART RATE: 60 BPM | DIASTOLIC BLOOD PRESSURE: 78 MMHG | HEIGHT: 69 IN | RESPIRATION RATE: 8 BRPM | TEMPERATURE: 97.9 F

## 2020-12-31 DIAGNOSIS — Z12.5 SCREENING FOR PROSTATE CANCER: ICD-10-CM

## 2020-12-31 DIAGNOSIS — Z11.4 SCREENING FOR HIV (HUMAN IMMUNODEFICIENCY VIRUS): ICD-10-CM

## 2020-12-31 DIAGNOSIS — Z80.42 FAMILY HX OF PROSTATE CANCER: ICD-10-CM

## 2020-12-31 DIAGNOSIS — Z12.11 SCREENING FOR COLON CANCER: ICD-10-CM

## 2020-12-31 DIAGNOSIS — Z00.00 ROUTINE GENERAL MEDICAL EXAMINATION AT A HEALTH CARE FACILITY: Primary | ICD-10-CM

## 2020-12-31 DIAGNOSIS — I83.811 VARICOSE VEINS OF RIGHT LOWER EXTREMITY WITH PAIN: ICD-10-CM

## 2020-12-31 LAB
ALBUMIN SERPL-MCNC: 3.8 G/DL (ref 3.4–5)
ALP SERPL-CCNC: 78 U/L (ref 40–150)
ALT SERPL W P-5'-P-CCNC: 28 U/L (ref 0–70)
ANION GAP SERPL CALCULATED.3IONS-SCNC: 5 MMOL/L (ref 3–14)
AST SERPL W P-5'-P-CCNC: 21 U/L (ref 0–45)
BILIRUB SERPL-MCNC: 0.5 MG/DL (ref 0.2–1.3)
BUN SERPL-MCNC: 15 MG/DL (ref 7–30)
CALCIUM SERPL-MCNC: 9 MG/DL (ref 8.5–10.1)
CHLORIDE SERPL-SCNC: 111 MMOL/L (ref 94–109)
CHOLEST SERPL-MCNC: 185 MG/DL
CO2 SERPL-SCNC: 25 MMOL/L (ref 20–32)
CREAT SERPL-MCNC: 0.94 MG/DL (ref 0.66–1.25)
GFR SERPL CREATININE-BSD FRML MDRD: 88 ML/MIN/{1.73_M2}
GLUCOSE SERPL-MCNC: 97 MG/DL (ref 70–99)
HDLC SERPL-MCNC: 57 MG/DL
LDLC SERPL CALC-MCNC: 108 MG/DL
NONHDLC SERPL-MCNC: 128 MG/DL
POTASSIUM SERPL-SCNC: 4.2 MMOL/L (ref 3.4–5.3)
PROT SERPL-MCNC: 7.4 G/DL (ref 6.8–8.8)
PSA SERPL-ACNC: 0.65 UG/L (ref 0–4)
SODIUM SERPL-SCNC: 141 MMOL/L (ref 133–144)
TRIGL SERPL-MCNC: 102 MG/DL

## 2020-12-31 PROCEDURE — 36415 COLL VENOUS BLD VENIPUNCTURE: CPT | Performed by: PHYSICIAN ASSISTANT

## 2020-12-31 PROCEDURE — 99396 PREV VISIT EST AGE 40-64: CPT | Performed by: PHYSICIAN ASSISTANT

## 2020-12-31 PROCEDURE — 80061 LIPID PANEL: CPT | Performed by: PHYSICIAN ASSISTANT

## 2020-12-31 PROCEDURE — G0103 PSA SCREENING: HCPCS | Performed by: PHYSICIAN ASSISTANT

## 2020-12-31 PROCEDURE — 87389 HIV-1 AG W/HIV-1&-2 AB AG IA: CPT | Performed by: PHYSICIAN ASSISTANT

## 2020-12-31 PROCEDURE — 80053 COMPREHEN METABOLIC PANEL: CPT | Performed by: PHYSICIAN ASSISTANT

## 2020-12-31 ASSESSMENT — ENCOUNTER SYMPTOMS
FEVER: 0
ABDOMINAL PAIN: 0
HEMATURIA: 0
FREQUENCY: 0
WEAKNESS: 0
COUGH: 0
DYSURIA: 0
JOINT SWELLING: 0
SHORTNESS OF BREATH: 0
CONSTIPATION: 0
HEARTBURN: 0
NAUSEA: 0
ARTHRALGIAS: 0
MYALGIAS: 0
NERVOUS/ANXIOUS: 0
HEMATOCHEZIA: 0
DIZZINESS: 0
CHILLS: 0
DIARRHEA: 0
SORE THROAT: 0
EYE PAIN: 0
PALPITATIONS: 0
HEADACHES: 0
PARESTHESIAS: 0

## 2020-12-31 ASSESSMENT — MIFFLIN-ST. JEOR: SCORE: 1627.99

## 2020-12-31 NOTE — TELEPHONE ENCOUNTER
"Referral received via EPIC \"Work Que\", per  guidelines referral forwarded to [facility].   Reason for referral: Varicose veins of right lower extremity with pain.    Gabrielle Tran, MICHAELLEN, RN  [facility] [employer]    "

## 2020-12-31 NOTE — PROGRESS NOTES
SUBJECTIVE:   CC: Duran Garner is an 60 year old male who presents for preventative health visit.     Patient has been advised of split billing requirements and indicates understanding: Yes  Healthy Habits:     Getting at least 3 servings of Calcium per day:  Yes    Bi-annual eye exam:  Yes    Dental care twice a year:  Yes    Sleep apnea or symptoms of sleep apnea:  None    Diet:  Regular (no restrictions)    Frequency of exercise:  4-5 days/week    Duration of exercise:  30-45 minutes    Taking medications regularly:  Yes    Medication side effects:  Not applicable    PHQ-2 Total Score: 0    Additional concerns today:  No    Duran Garner is a 60 year old male who presents today for annual check up  He feels well overall  No labs thru [employer] this year; is fasting today  Signed up for [facility] next fall  No other concerns      Today's PHQ-2 Score:   PHQ-2 ( 1999 Pfizer) 12/29/2020   Q1: Little interest or pleasure in doing things 0   Q2: Feeling down, depressed or hopeless 0   PHQ-2 Score 0   Q1: Little interest or pleasure in doing things Not at all   Q2: Feeling down, depressed or hopeless Not at all   PHQ-2 Score 0       Abuse: Current or Past(Physical, Sexual or Emotional)- No  Do you feel safe in your environment? Yes    Have you ever done Advance Care Planning? (For example, a Health Directive, POLST, or a discussion with a medical provider or your loved ones about your wishes): No, advance care planning information given to patient to review.  Patient plans to discuss their wishes with loved ones or provider.      Social History     Tobacco Use     Smoking status: Never Smoker     Smokeless tobacco: Never Used   Substance Use Topics     Alcohol use: Yes     Alcohol/week: 0.0 - 0.8 standard drinks         Alcohol Use 12/29/2020   Prescreen: >3 drinks/day or >7 drinks/week? No   Prescreen: >3 drinks/day or >7 drinks/week? -       Last PSA:   PSA   Date Value Ref Range Status   10/07/2019 0.51 0 - 4 ug/L  "Final     Comment:     Assay Method:  Chemiluminescence using Siemens Vista analyzer       Reviewed orders with patient. Reviewed health maintenance and updated orders accordingly - Yes  Lab work is in process    Reviewed and updated as needed this visit by clinical staff  Tobacco  Allergies  Meds   Med Hx  Surg Hx  Fam Hx  Soc Hx        Reviewed and updated as needed this visit by Provider                    Review of Systems   Constitutional: Negative for chills and fever.   HENT: Negative for congestion, ear pain, hearing loss and sore throat.    Eyes: Negative for pain and visual disturbance.   Respiratory: Negative for cough and shortness of breath.    Cardiovascular: Negative for chest pain, palpitations and peripheral edema.   Gastrointestinal: Negative for abdominal pain, constipation, diarrhea, heartburn, hematochezia and nausea.   Genitourinary: Negative for discharge, dysuria, frequency, genital sores, hematuria, impotence and urgency.   Musculoskeletal: Negative for arthralgias, joint swelling and myalgias.   Skin: Negative for rash.   Neurological: Negative for dizziness, weakness, headaches and paresthesias.   Psychiatric/Behavioral: Negative for mood changes. The patient is not nervous/anxious.      OBJECTIVE:   /78 (BP Location: Right arm, Patient Position: Sitting, Cuff Size: Adult Regular)   Pulse 60   Temp 97.9  F (36.6  C) (Oral)   Resp 8   Ht 1.74 m (5' 8.5\")   Wt 82.6 kg (182 lb)   BMI 27.27 kg/m      Physical Exam  GENERAL: healthy, alert and no distress  EYES: Eyes grossly normal to inspection, PERRL and conjunctivae and sclerae normal  HENT: ear canals and TM's normal, nose and mouth without ulcers or lesions  NECK: no adenopathy, no asymmetry, masses, or scars and thyroid normal to palpation  RESP: lungs clear to auscultation - no rales, rhonchi or wheezes  CV: regular rate and rhythm, normal S1 S2, no S3 or S4, no murmur, click or rub, no peripheral edema and peripheral " "pulses strong  ABDOMEN: soft, nontender, no hepatosplenomegaly, no masses and bowel sounds normal  MS: grossly normal; tortuous varicose veins to the right  SKIN: healing excisional biopsy to the back  NEURO: Normal strength and tone, mentation intact and speech normal  PSYCH: mentation appears normal, affect normal/bright    Diagnostic Test Results:  Labs reviewed in Epic  Updating today    ASSESSMENT/PLAN:   1. Routine general medical examination at a health care facility  Reviewed personal and family history. Reviewed age appropriate screenings. Recommended any needed vaccinations. He will consider shingrix at 62  - Lipid panel reflex to direct LDL Fasting  - REVIEW OF HEALTH MAINTENANCE PROTOCOL ORDERS  - Comprehensive metabolic panel (BMP + Alb, Alk Phos, ALT, AST, Total. Bili, TP)    2. Screening for HIV (human immunodeficiency virus)  Routine screen per CDC    3. Screening for colon cancer  - Fecal colorectal cancer screen FIT; Future  - HIV Antigen Antibody Combo    4. Family hx of prostate cancer  - Prostate spec antigen screen    5. Varicose veins of right lower extremities with pain  Updating referral. He'll consider treatment  - VASCULAR MEDICINE REFERRAL; Future    Patient has been advised of split billing requirements and indicates understanding: Yes  COUNSELING:   Reviewed preventive health counseling, as reflected in patient instructions    Estimated body mass index is 27.27 kg/m  as calculated from the following:    Height as of this encounter: 1.74 m (5' 8.5\").    Weight as of this encounter: 82.6 kg (182 lb).       He reports that he has never smoked. He has never used smokeless tobacco.      Counseling Resources:  ATP IV Guidelines  Pooled Cohorts Equation Calculator  FRAX Risk Assessment  ICSI Preventive Guidelines  Dietary Guidelines for Americans, 2010  USDA's MyPlate  ASA Prophylaxis  Lung CA Screening    Usama Interiano PA-C  [facility]  "

## 2021-01-03 LAB — HIV 1+2 AB+HIV1 P24 AG SERPL QL IA: NONREACTIVE

## 2021-01-08 DIAGNOSIS — Z12.11 SCREENING FOR COLON CANCER: ICD-10-CM

## 2021-01-08 LAB — HEMOCCULT STL QL IA: NEGATIVE

## 2021-01-08 PROCEDURE — 82274 ASSAY TEST FOR BLOOD FECAL: CPT | Performed by: PHYSICIAN ASSISTANT

## 2021-01-11 NOTE — TELEPHONE ENCOUNTER
LM on preferred cell number stating this was our final attempt to reach him but that he should feel free to reach out to us if he is interested in scheduling.

## 2021-02-03 ENCOUNTER — DOCUMENTATION ONLY (OUTPATIENT)
Dept: OTHER | Facility: CLINIC | Age: 61
End: 2021-02-03

## 2021-02-03 ENCOUNTER — AMBULATORY - HEALTHEAST (OUTPATIENT)
Dept: OTHER | Facility: CLINIC | Age: 61
End: 2021-02-03

## 2021-08-25 ENCOUNTER — TELEPHONE (OUTPATIENT)
Dept: FAMILY MEDICINE | Facility: CLINIC | Age: 61
End: 2021-08-25

## 2021-08-25 ENCOUNTER — MYC MEDICAL ADVICE (OUTPATIENT)
Dept: FAMILY MEDICINE | Facility: CLINIC | Age: 61
End: 2021-08-25

## 2021-08-25 ENCOUNTER — APPOINTMENT (OUTPATIENT)
Dept: URGENT CARE | Facility: CLINIC | Age: 61
End: 2021-08-25
Payer: COMMERCIAL

## 2021-08-25 DIAGNOSIS — K12.1 ORAL ULCER: Primary | ICD-10-CM

## 2021-08-25 RX ORDER — LIDOCAINE HYDROCHLORIDE 20 MG/ML
5 SOLUTION OROPHARYNGEAL
Qty: 100 ML | Refills: 0 | Status: SHIPPED | OUTPATIENT
Start: 2021-08-25 | End: 2021-08-25

## 2021-08-25 RX ORDER — LIDOCAINE HYDROCHLORIDE 20 MG/ML
5 SOLUTION OROPHARYNGEAL
Qty: 100 ML | Refills: 0 | Status: SHIPPED | OUTPATIENT
Start: 2021-08-25 | End: 2021-11-30

## 2021-08-25 NOTE — TELEPHONE ENCOUNTER
Patient requested rx to be re-sent to [facility] in [place] as patient is staying there currently.    Rosemary Rosenbaum RN

## 2021-08-25 NOTE — TELEPHONE ENCOUNTER
Multiple mc about how to do e-visit. Walked him through it on phone too. No virt appts today or tomorrow.     Kat MAYES RN

## 2021-08-25 NOTE — TELEPHONE ENCOUNTER
Pt calling in- dx w/ Covid 8/20 due to exposure. He has a ulcer on the back, side of his throat where his tonsils would be. He says it is about the size of a dime.   He is able to drink/eat.   He said it is painful- wondering if there is something he take.      No VIRT spots today/tomorrow. Adv could try to send a pic and do an e-visit.     Walked him through the steps.     Covid otherwise feels like a bad head cold to him.     Kat MAYES RN

## 2021-08-25 NOTE — TELEPHONE ENCOUNTER
Not sure what was talked about or if he's still planning to set one up but ill just send something in.

## 2021-08-25 NOTE — TELEPHONE ENCOUNTER
Reason for Call:  Other prescription request    Detailed comments: patient is calling because he has a throat ulcer due to covid and would like an antibiotic. Please follow up with patient.     Phone Number Patient can be reached at: Cell number on file:    Telephone Information:   Mobile [phone]       Best Time: any    Can we leave a detailed message on this number? YES    Call taken on 8/25/2021 at 8:43 AM by Rosalva Ahn

## 2021-10-23 ENCOUNTER — HEALTH MAINTENANCE LETTER (OUTPATIENT)
Age: 61
End: 2021-10-23

## 2021-11-30 ENCOUNTER — OFFICE VISIT (OUTPATIENT)
Dept: VASCULAR SURGERY | Facility: CLINIC | Age: 61
End: 2021-11-30
Payer: COMMERCIAL

## 2021-11-30 DIAGNOSIS — I83.891 VARICOSE VEINS OF LEG WITH SWELLING, RIGHT: Primary | ICD-10-CM

## 2021-11-30 DIAGNOSIS — I83.811 VARICOSE VEINS OF RIGHT LOWER EXTREMITY WITH PAIN: ICD-10-CM

## 2021-11-30 PROCEDURE — 99203 OFFICE O/P NEW LOW 30 MIN: CPT | Performed by: SURGERY
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
## 2021-12-13 ENCOUNTER — ANCILLARY PROCEDURE (OUTPATIENT)
Dept: ULTRASOUND IMAGING | Facility: CLINIC | Age: 61
End: 2021-12-13
Attending: SURGERY
Payer: COMMERCIAL

## 2021-12-13 ENCOUNTER — OFFICE VISIT (OUTPATIENT)
Dept: VASCULAR SURGERY | Facility: CLINIC | Age: 61
End: 2021-12-13
Attending: SURGERY
Payer: COMMERCIAL

## 2021-12-13 DIAGNOSIS — I83.891 VARICOSE VEINS OF LEG WITH SWELLING, RIGHT: ICD-10-CM

## 2021-12-13 DIAGNOSIS — I83.811 VARICOSE VEINS OF RIGHT LOWER EXTREMITY WITH PAIN: ICD-10-CM

## 2021-12-13 DIAGNOSIS — I83.811 VARICOSE VEINS OF RIGHT LOWER EXTREMITY WITH PAIN: Primary | ICD-10-CM

## 2021-12-13 PROCEDURE — 93971 EXTREMITY STUDY: CPT | Mod: RT | Performed by: SURGERY

## 2021-12-13 PROCEDURE — 99213 OFFICE O/P EST LOW 20 MIN: CPT | Performed by: SURGERY

## 2021-12-13 NOTE — LETTER
12/13/2021         RE: Duran Garner  [address] [address]        Dear Colleague,    Thank you for referring your patient, Duran Garner, to the [facility] [place]. Please see a copy of my visit note below.    [facility] [place] Progress Note    Duran Garner presents in follow-up of right lower extremity varicose veins with pain and complications of superficial thrombophlebitis.  See my office note of 11/30/2021 for details.  He returned today for a right lower extremity venous competency study.      Physical Exam  General: Pleasant male in no acute distress.  Blood pressure 115/78, pulse 58  Extremities: Right lower extremity: 4 mm varicosities on the right medial thigh with 6 mm varicosities over the right medial calf.  Corona phlebectatica about the right ankle.  1+ edema.    Ultrasound:  INDICATION:  Right leg vv with pain; hx of superficial thrombus of GSV/vv     EXAM TYPE  RIGHT LOWER EXTREMITY VENOUS DUPLEX FOR VENOUS INSUFFICIENCY  TECHNICAL SUMMARY     A duplex ultrasound study using color flow was performed, to evaluate the right lower extremity veins for valvular incompetence with the patient in a steep reversed trendelenberg.      RIGHT:     The deep veins demonstrate phasic flow, compress and respond to augmentations.  There is no DVT.  The common femoral vein is incompetent and free of thrombus. The remaining deep veins are competent and free of thrombus.      The GSV demonstrates phasic flow, compresses and responds to augmentations from the saphenofemoral junction to the ankle with no evidence of thrombus. The great saphenous vein measures 8.8 mm at the saphenofemoral junction, 8.3 mm at the proximal thigh and 8.4 mm at the knee. The GSV courses superficial and is mildly tortuous from the distal thigh to the mid calf.  The GSV is incompetent from SFJ  to Distal Calf, with the greatest reflux time of 4735 milliseconds.  The GSV  gives rise to multiple incompetent varicose veins, the largest measures 6.3 mm off the Knee that courses Anteromedial with a reflux time of 2062 milliseconds.     The AASV is not visualized.      The Giacomini vein is absent.     The SSV demonstrates phasic flow, compresses and responds to augmentations from the popliteal space to the ankle.  No reflux or thrombus is seen. The saphenopopliteal junction is competent ( 3.0 mm).   The SSV gives rise to a varicose branch measuring 2.3 mm off the Mid Calf that courses Medial with a reflux time of 2112 milliseconds.       Perforators: There is an incompetent  vein ( 4.2  mm) at distal calf 7 cm from medial mallelous that communicates with an incompetent varicose vein. This is a dual  sub fascia, becoming one as it breaks the fascia.   There is a second incompetent  vein at the mid calf 14 cm below the knee crease that communicates with an incompetent varicose vein.      Incidental finding: A complex non-vascular fluid collection is seen in the medial popliteal fossa measuring 2.8 x 1.3 x 0.6 cm, probable Bakers cyst.      LEFT:     The CFV demonstrate phasic flow, compress and respond to augmentations.  There is no DVT.       FINAL SUMMARY:  1.         No evidence of right lower extremity deep vein thrombus.  2.         Right common femoral vein incompetence.  3.         Right greater saphenous vein incompetence.  4.         Right incompetent  veins.  5.         Right incompetent varicose veins.  6.         The time of incompetence is greater than 500 milliseconds in the superficial and  veins and greater than 1000 milliseconds in the deep veins.      Assessment:  Symptomatic right great saphenous vein incompetence with secondary branch varicose veins and complications of superficial thrombophlebitis.  We discussed options of continued conservative management with exercise, compression, leg elevation and dietary measures.   He is a triathlete and exercises vigorously, regularly.  He has worn compression hose for years in his job as an .  He wishes to have this treated definitively.    He is a candidate for radiofrequency ablation of the right great saphenous vein with medically necessary phlebectomies.  Details procedure including risks of bleeding, infection, nerve injury, scarring, hyperpigmentation, deep vein thrombosis, recanalization of the great saphenous vein and recurrent varicose veins were discussed.  The patient voiced understanding and wished to proceed.     Plan:  Radiofrequency ablation the right great saphenous vein with medically necessary phlebectomies    Yannick Edwards MD     Dictated using Dragon voice recognition software which may result in transcription errors            Again, thank you for allowing me to participate in the care of your patient.        Sincerely,        Yannick Edwards MD

## 2021-12-16 NOTE — PROGRESS NOTES
[facility] [place] Progress Note    Duran Garner presents in follow-up of right lower extremity varicose veins with pain and complications of superficial thrombophlebitis.  See my office note of 11/30/2021 for details.  He returned today for a right lower extremity venous competency study.      Physical Exam  General: Pleasant male in no acute distress.  Blood pressure 115/78, pulse 58  Extremities: Right lower extremity: 4 mm varicosities on the right medial thigh with 6 mm varicosities over the right medial calf.  Corona phlebectatica about the right ankle.  1+ edema.    Ultrasound:  INDICATION:  Right leg vv with pain; hx of superficial thrombus of GSV/vv     EXAM TYPE  RIGHT LOWER EXTREMITY VENOUS DUPLEX FOR VENOUS INSUFFICIENCY  TECHNICAL SUMMARY     A duplex ultrasound study using color flow was performed, to evaluate the right lower extremity veins for valvular incompetence with the patient in a steep reversed trendelenberg.      RIGHT:     The deep veins demonstrate phasic flow, compress and respond to augmentations.  There is no DVT.  The common femoral vein is incompetent and free of thrombus. The remaining deep veins are competent and free of thrombus.      The GSV demonstrates phasic flow, compresses and responds to augmentations from the saphenofemoral junction to the ankle with no evidence of thrombus. The great saphenous vein measures 8.8 mm at the saphenofemoral junction, 8.3 mm at the proximal thigh and 8.4 mm at the knee. The GSV courses superficial and is mildly tortuous from the distal thigh to the mid calf.  The GSV is incompetent from SFJ  to Distal Calf, with the greatest reflux time of 4735 milliseconds.  The GSV gives rise to multiple incompetent varicose veins, the largest measures 6.3 mm off the Knee that courses Anteromedial with a reflux time of 2062 milliseconds.     The AASV is not visualized.      The Giacomini vein is absent.     The SSV demonstrates phasic  flow, compresses and responds to augmentations from the popliteal space to the ankle.  No reflux or thrombus is seen. The saphenopopliteal junction is competent ( 3.0 mm).   The SSV gives rise to a varicose branch measuring 2.3 mm off the Mid Calf that courses Medial with a reflux time of 2112 milliseconds.       Perforators: There is an incompetent  vein ( 4.2  mm) at distal calf 7 cm from medial mallelous that communicates with an incompetent varicose vein. This is a dual  sub fascia, becoming one as it breaks the fascia.   There is a second incompetent  vein at the mid calf 14 cm below the knee crease that communicates with an incompetent varicose vein.      Incidental finding: A complex non-vascular fluid collection is seen in the medial popliteal fossa measuring 2.8 x 1.3 x 0.6 cm, probable Bakers cyst.      LEFT:     The CFV demonstrate phasic flow, compress and respond to augmentations.  There is no DVT.       FINAL SUMMARY:  1.         No evidence of right lower extremity deep vein thrombus.  2.         Right common femoral vein incompetence.  3.         Right greater saphenous vein incompetence.  4.         Right incompetent  veins.  5.         Right incompetent varicose veins.  6.         The time of incompetence is greater than 500 milliseconds in the superficial and  veins and greater than 1000 milliseconds in the deep veins.      Assessment:  Symptomatic right great saphenous vein incompetence with secondary branch varicose veins and complications of superficial thrombophlebitis.  We discussed options of continued conservative management with exercise, compression, leg elevation and dietary measures.  He is a triathlete and exercises vigorously, regularly.  He has worn compression hose for years in his job as an .  He wishes to have this treated definitively.    He is a candidate for radiofrequency ablation of the right great saphenous vein  with medically necessary phlebectomies.  Details procedure including risks of bleeding, infection, nerve injury, scarring, hyperpigmentation, deep vein thrombosis, recanalization of the great saphenous vein and recurrent varicose veins were discussed.  The patient voiced understanding and wished to proceed.     Plan:  Radiofrequency ablation the right great saphenous vein with medically necessary phlebectomies    Yannick Edwards MD     Dictated using Dragon voice recognition software which may result in transcription errors

## 2022-01-14 ENCOUNTER — IMMUNIZATION (OUTPATIENT)
Dept: NURSING | Facility: CLINIC | Age: 62
End: 2022-01-14
Payer: COMMERCIAL

## 2022-01-14 PROCEDURE — 91305 COVID-19,PF,PFIZER (12+ YRS): CPT

## 2022-01-14 PROCEDURE — 0054A COVID-19,PF,PFIZER (12+ YRS): CPT

## 2022-01-27 DIAGNOSIS — I83.811 VARICOSE VEINS OF RIGHT LOWER EXTREMITY WITH PAIN: Primary | ICD-10-CM

## 2022-02-12 ENCOUNTER — HEALTH MAINTENANCE LETTER (OUTPATIENT)
Age: 62
End: 2022-02-12

## 2022-03-02 ENCOUNTER — TELEPHONE (OUTPATIENT)
Dept: VASCULAR SURGERY | Facility: CLINIC | Age: 62
End: 2022-03-02
Payer: COMMERCIAL

## 2022-03-02 DIAGNOSIS — I83.811 VARICOSE VEINS OF RIGHT LOWER EXTREMITY WITH PAIN: Primary | ICD-10-CM

## 2022-03-02 RX ORDER — CLONIDINE HYDROCHLORIDE 0.1 MG/1
TABLET ORAL
Qty: 1 TABLET | Refills: 0 | Status: SHIPPED | OUTPATIENT
Start: 2022-03-02 | End: 2022-03-17

## 2022-03-02 RX ORDER — LORAZEPAM 1 MG/1
TABLET ORAL
Qty: 3 TABLET | Refills: 0 | Status: SHIPPED | OUTPATIENT
Start: 2022-03-02 | End: 2022-03-17

## 2022-03-02 NOTE — TELEPHONE ENCOUNTER
Vein Clinic Preoperative Nurse Call    Procedure: Right leg VNUS closure GSV(med nec), Right leg 10-20 stab phlebs(med  nec), Left leg 0.5 unit phlebs($)   Date: 3/14/22  Surgeon: Jeryr  Time: 0900  Check in time: 0745    Called and spoke to patient. Informed patient: when to check in (0745) to sign consent, to bring their preop medications in their original bottle with them (3mg ativan, 0.1mg clonidine). Patient will take the medications after signing the consent to the procedure. Instructed patient to wear a mask, wear loose-fitting comfortable clothing, and bring their compression hose. Ensured patient has a /someone that will be responsible for them the rest of the day. Visitors are allowed in the clinic, but they would need to stay in an exam room or wait in the parking lot or leave. If  does leave, IF POSSIBLE, we ask that they do not go more than 15-20 mins from our clinic. Once procedure is completed, we will keep patient in recovery for 30-45 mins, and call  with aftercare instructions. Informed patient, that if possible, they should sit in the backseat to elevate their leg on the ride home. Explained to patient that someone will need to stay with patient after procedure while at home due to the medications that are taken prior to procedure. Patient verbalized understanding of all information.     Pt needs thigh-high compression hose for procedure. Status of the hose: patient plans to order them online today. Encouraged him to call the clinic back if he is unable to find what he is needing or having trouble.    Special instructions: none    Special COVID-19 instructions: Patient aware they need to wear a mask to our clinic and during their procedure. Patient aware that their  can come in with them, but would need to stay in an exam room during the procedure or wait in the parking lot or leave. Nurse will call pt's  when procedure is over.    Patient understands that if  they have any of the following symptoms (fever, cough, shortness of breath, rash), they need to notify us immediately to cancel their procedure and will have to reschedule for a later date.    Patient is in agreement with preoperative information and has no further questions.    Jade Mac RN  3/2/2022

## 2022-03-14 ENCOUNTER — LAB (OUTPATIENT)
Dept: LAB | Facility: CLINIC | Age: 62
End: 2022-03-14
Payer: COMMERCIAL

## 2022-03-14 ENCOUNTER — OFFICE VISIT (OUTPATIENT)
Dept: VASCULAR SURGERY | Facility: CLINIC | Age: 62
End: 2022-03-14
Payer: COMMERCIAL

## 2022-03-14 VITALS — DIASTOLIC BLOOD PRESSURE: 62 MMHG | SYSTOLIC BLOOD PRESSURE: 99 MMHG | HEART RATE: 62 BPM | OXYGEN SATURATION: 96 %

## 2022-03-14 DIAGNOSIS — I83.891 VARICOSE VEINS OF LEG WITH EDEMA, RIGHT: ICD-10-CM

## 2022-03-14 DIAGNOSIS — I83.811 VARICOSE VEINS OF RIGHT LOWER EXTREMITY WITH PAIN: ICD-10-CM

## 2022-03-14 DIAGNOSIS — Z77.21 EXPOSURE TO BLOOD OR BODY FLUID: ICD-10-CM

## 2022-03-14 DIAGNOSIS — I83.92 ASYMPTOMATIC VARICOSE VEINS OF LEFT LOWER EXTREMITY: ICD-10-CM

## 2022-03-14 LAB
HBV SURFACE AG SERPL QL IA: NONREACTIVE
HIV 1+2 AB+HIV1 P24 AG SERPL QL IA: NEGATIVE
HIV 1+2 AB+HIV1P24 AG SERPLBLD IA.RAPID: NON REACTIVE
HIV 1+2 AB+HIV1P24 AG SERPLBLD IA.RAPID: NON REACTIVE
HIV INTERPRETATION: NORMAL

## 2022-03-14 PROCEDURE — S9999 SALES TAX: HCPCS | Performed by: SURGERY

## 2022-03-14 PROCEDURE — 36475 ENDOVENOUS RF 1ST VEIN: CPT | Mod: RT | Performed by: SURGERY

## 2022-03-14 PROCEDURE — 999N000104 HIV RAPID ANTIBODY SCREEN

## 2022-03-14 PROCEDURE — 37799 UNLISTED PX VASCULAR SURGERY: CPT | Performed by: SURGERY

## 2022-03-14 PROCEDURE — 36415 COLL VENOUS BLD VENIPUNCTURE: CPT

## 2022-03-14 PROCEDURE — 37766 PHLEB VEINS - EXTREM 20+: CPT | Mod: RT | Performed by: SURGERY

## 2022-03-14 PROCEDURE — 37765 STAB PHLEB VEINS XTR 10-20: CPT | Mod: LT | Performed by: SURGERY

## 2022-03-14 RX ORDER — HYDROCODONE BITARTRATE AND ACETAMINOPHEN 5; 325 MG/1; MG/1
2 TABLET ORAL EVERY 6 HOURS PRN
Qty: 2 TABLET | Refills: 0 | Status: SHIPPED | OUTPATIENT
Start: 2022-03-14 | End: 2022-03-17

## 2022-03-14 NOTE — PROGRESS NOTES
Pre-procedure Nursing Note    Duran Garner presents to clinic for Vein Procedure  .   /Person Responsible for Patient: [place] (wife)  Phone Number: [phone]    Prophylactic Medication:N/A   Sedation Medication: Ativan, 3mg ,   Time Taken: 0811 and Clonidine, 0.1mg,   Time Taken: 0811  Compression Stockings: Hose at home  The procedure is being performed on BLE.  Patient understanding of procedure matches consent? YES    Patient's pre-procedure medications verified by AF.    Jade Mac RN on 3/14/2022 at 8:12 AM

## 2022-03-14 NOTE — PROGRESS NOTES
Vein Clinic Procedure Note    Indications:  1.  Right leg varicose veins with pain; symptoms recalcitrant to conservative measures  2.  Asymptomatic varicose veins left lower extremity    Procedure:  1. Radiofrequency ablation right great saphenous vein  2. Multiple, medically necessary phlebectomies right lower extremity (greater than 20 stabs)  3. Cosmetic phlebectomies left lower extremity (limited)    Surgeon  ASHLEY Edwards MD    Procedure Description  Details of the procedure including risks of bleeding, infection, nerve injury, scarring, hyperpigmentation, deep vein thrombosis, recanalization of the great saphenous vein and recurrent varicose veins all discussed.  The patient voiced understanding and wished to proceed.  Informed consent was obtained.     I had the patient stand and marked varicosities coursing along the right medial calf extending anterolaterally and distally to the anterior right ankle with an indelible marker.  I then marked a few varicosities in the left popliteal fossa extending down the posterior left calf and 1 on the distal medial left leg with an indelible marker.  We then proceeded the operating room, had the patient lie supine on the operating table, then prepped and draped the bilateral lower extremities sterilely.    We took a timeout to confirm the appropriate operative site and procedure: Radiofrequency ablation of the right great saphenous vein with mechanistic phlebectomies; cosmetic left lower extremity phlebectomies.    VNUS Closure  I imaged the right lower extremity easily identifying the right great saphenous vein.  The vein broke through the fascia near the mid thigh level became immediately subcutaneous as the varicosity coursing down the distal medial left thigh.  Numerous tributaries coursed from this below the knee.  I chose access the vein just as it broke through the fascia near the mid thigh.  I infiltrated the skin overlying the vein at the mid thigh, placed a  micropuncture needle into the vein followed by a micropuncture guidewire and a 7 [place] sheath.    We passed the closure fast device through the sheath, positioning of the tip of the device 2.39 centimeters from the saphenofemoral junction under ultrasound guidance with the operating table in Trendelenburg position.  Tumescent anesthetic was injected along the course of the vein under ultrasound guidance with care to confirm catheter tip position prior to infiltrating the tissues in this location.  The same tumescent anesthetic was also injected around each of the marked varicosities.    After allowing the block to take effect and after confirming appropriate position, I applied compression to the left great saphenous vein with the ultrasound probe and additional width 2 fingerbreadths treating the first three 7 cm increments of the vein with 2 RF sessions each.  The remaining vein was treated with 1 RF session to each 7cm increment with the exception of segments of vein where energy readings were higher requiring additional treatments.  We treated down to the mid thigh..  The patient was comfortable throughout.    After completing the pullback, I reimaged the vein and the vein to be non-compressible and closed.  The saphenofemoral junction and common femoral veins were fully compressible and free of thrombus. The sheath and the catheter were removed and hemostasis secured with pressure.    Medically necessary phlebectomies  We made stab wounds beside each of the marked varicosities with 11 scalpel, retrieved the veins with either vein hooks or nate hooks, clamped them with mosquito clamps and avulsed them.  Hemostasis was secured with pressure.  Greater than 20 stabs were made on the right leg.    Cosmetic phlebectomies  We then made stab wounds beside each of the marked varicosities in the left popliteal fossa, extending down the posterior calf and onto the distal medial left leg, retrieved the veins with  either vein posterior approach Ashok and then ankles and with mosquito clamps.  Hemostasis was secured with pressure.  5 stabs were made on the left lower extremity.    After completing the phlebectomies, the leg was cleaned with saline solution and petroleum jelly was applied to the skin.  The leg was then dressed with ABD pads, cast padding and an Ace bandage from the toes to the groin.   We observed the patient for 30 minutes to ensure excellent hemostasis then took the patient to their ride in a wheelchair.  Post procedure instructions were given to the patient and his wife in verbal and written form.  They both voiced understanding and their questions were answered.    The patient tolerated the procedure well without evidence of allergic reaction or other complications and will return in 72 hours for a left lower extremity venous ultrasound.    Marva Closure    Date/Time: 3/14/2022 11:01 AM  Performed by: Yannick Edwards MD  Authorized by: Yannick Edwards MD     Preparation: Patient was prepped and draped in usual sterile fashion    1st Assist: Leyla Harrison, CST/CSFA    Procedure:  VNUS  Procedure side:  Right  One Vein    Vein Treated:  GSV  Patient tolerance:  Patient tolerated the procedure well with no immediate complications  Phlebectomy    Date/Time: 3/14/2022 11:01 AM  Performed by: Yannick Edwards MD  Authorized by: Yannick Edwards MD     Procedure:  Phlebectomies  Type:  Medically Necessary  Procedure side:  Right  Stabs:  >20  Patient tolerance:  Patient tolerated the procedure well with no immediate complications  Phlebectomy    Date/Time: 3/14/2022 11:01 AM  Performed by: Yannick Edwards MD  Authorized by: Yannick Edwards MD     Procedure:  Phlebectomies  Type:  Cosmetic  Procedure side:  Left  Session:  Limited  Patient tolerance:  Patient tolerated the procedure well with no immediate complications  Wrap/Hose:   Wraps        Flowsheet Data 3/14/2022   Procedure Start Time:  9:23 AM   Prep: Chloraprep   Side: Bilateral   Tx Length (cm): RT GSV: 30.5   Junction (cm): RT GSV: 2.39   RF Cycles: RT GSV: 8   RF TX Time (Minutes): RT GSV: 2:33   # PHLEB Sites: RIGHT 24. LEFT 5   Sedation taken: Yes   Pre Pt. Physical / Cognitive Limitations: WNL   TOTAL Local anesthesia Injected (ml): 3   Max Volume Local Anesthesia (ml): 11   TOTAL Tumescent Injected volume (ml): 475   Max Volume Tumescent (ml): 572   Post Pt. Physical / Cognitive Limitations: WNL   Procedure End Time: 10:44 AM   D/C Instructions given, states readiness to leave and escorted to car: Yes       C Diego Edwards MD    Dictated using Dragon voice recognition software which may result in transcription errors

## 2022-03-14 NOTE — LETTER
3/14/2022         RE: Duran Garner  [address] [address]        Dear Colleague,    Thank you for referring your patient, Duran Garner, to the [facility] VEIN CLINIC [place]. Please see a copy of my visit note below.    Pre-procedure Nursing Note    Duran Garner presents to clinic for Vein Procedure  .   /Person Responsible for Patient: [place] (wife)  Phone Number: [phone]    Prophylactic Medication:N/A   Sedation Medication: Ativan, 3mg ,   Time Taken: 0811 and Clonidine, 0.1mg,   Time Taken: 0811  Compression Stockings: Hose at home  The procedure is being performed on BLE.  Patient understanding of procedure matches consent? YES    Patient's pre-procedure medications verified by AF.    Jade Mac RN on 3/14/2022 at 8:12 AM        Vein Clinic Procedure Note    Indications:  1.  Right leg varicose veins with pain; symptoms recalcitrant to conservative measures  2.  Asymptomatic varicose veins left lower extremity    Procedure:  1. Radiofrequency ablation right great saphenous vein  2. Multiple, medically necessary phlebectomies right lower extremity (greater than 20 stabs)  3. Cosmetic phlebectomies left lower extremity (limited)    Surgeon  ASHLEY Edwards MD    Procedure Description  Details of the procedure including risks of bleeding, infection, nerve injury, scarring, hyperpigmentation, deep vein thrombosis, recanalization of the great saphenous vein and recurrent varicose veins all discussed.  The patient voiced understanding and wished to proceed.  Informed consent was obtained.     I had the patient stand and marked varicosities coursing along the right medial calf extending anterolaterally and distally to the anterior right ankle with an indelible marker.  I then marked a few varicosities in the left popliteal fossa extending down the posterior left calf and 1 on the distal medial left leg with an indelible marker.  We then proceeded the operating room,  had the patient lie supine on the operating table, then prepped and draped the bilateral lower extremities sterilely.    We took a timeout to confirm the appropriate operative site and procedure: Radiofrequency ablation of the right great saphenous vein with mechanistic phlebectomies; cosmetic left lower extremity phlebectomies.    VNUS Closure  I imaged the right lower extremity easily identifying the right great saphenous vein.  The vein broke through the fascia near the mid thigh level became immediately subcutaneous as the varicosity coursing down the distal medial left thigh.  Numerous tributaries coursed from this below the knee.  I chose access the vein just as it broke through the fascia near the mid thigh.  I infiltrated the skin overlying the vein at the mid thigh, placed a micropuncture needle into the vein followed by a micropuncture guidewire and a 7 Barbadian sheath.    We passed the closure fast device through the sheath, positioning of the tip of the device 2.39 centimeters from the saphenofemoral junction under ultrasound guidance with the operating table in Trendelenburg position.  Tumescent anesthetic was injected along the course of the vein under ultrasound guidance with care to confirm catheter tip position prior to infiltrating the tissues in this location.  The same tumescent anesthetic was also injected around each of the marked varicosities.    After allowing the block to take effect and after confirming appropriate position, I applied compression to the left great saphenous vein with the ultrasound probe and additional width 2 fingerbreadths treating the first three 7 cm increments of the vein with 2 RF sessions each.  The remaining vein was treated with 1 RF session to each 7cm increment with the exception of segments of vein where energy readings were higher requiring additional treatments.  We treated down to the mid thigh..  The patient was comfortable throughout.    After completing the  pullback, I reimaged the vein and the vein to be non-compressible and closed.  The saphenofemoral junction and common femoral veins were fully compressible and free of thrombus. The sheath and the catheter were removed and hemostasis secured with pressure.    Medically necessary phlebectomies  We made stab wounds beside each of the marked varicosities with 11 scalpel, retrieved the veins with either vein hooks or nate hooks, clamped them with mosquito clamps and avulsed them.  Hemostasis was secured with pressure.  Greater than 20 stabs were made on the right leg.    Cosmetic phlebectomies  We then made stab wounds beside each of the marked varicosities in the left popliteal fossa, extending down the posterior calf and onto the distal medial left leg, retrieved the veins with either vein posterior approach Ashok and then ankles and with mosquito clamps.  Hemostasis was secured with pressure.  5 stabs were made on the left lower extremity.    After completing the phlebectomies, the leg was cleaned with saline solution and petroleum jelly was applied to the skin.  The leg was then dressed with ABD pads, cast padding and an Ace bandage from the toes to the groin.   We observed the patient for 30 minutes to ensure excellent hemostasis then took the patient to their ride in a wheelchair.  Post procedure instructions were given to the patient and his wife in verbal and written form.  They both voiced understanding and their questions were answered.    The patient tolerated the procedure well without evidence of allergic reaction or other complications and will return in 72 hours for a left lower extremity venous ultrasound.    Marva Closure    Date/Time: 3/14/2022 11:01 AM  Performed by: Yannick Edwards MD  Authorized by: Yannick Edwards MD     Preparation: Patient was prepped and draped in usual sterile fashion    1st Assist: Leyla Harrison, CST/CSFA    Procedure:  VNUS  Procedure side:   Right  One Vein    Vein Treated:  GSV  Patient tolerance:  Patient tolerated the procedure well with no immediate complications  Phlebectomy    Date/Time: 3/14/2022 11:01 AM  Performed by: Yannick Edwards MD  Authorized by: Yannick Edwards MD     Procedure:  Phlebectomies  Type:  Medically Necessary  Procedure side:  Right  Stabs:  >20  Patient tolerance:  Patient tolerated the procedure well with no immediate complications  Phlebectomy    Date/Time: 3/14/2022 11:01 AM  Performed by: Yannick Edwards MD  Authorized by: Yannick Edwards MD     Procedure:  Phlebectomies  Type:  Cosmetic  Procedure side:  Left  Session:  Limited  Patient tolerance:  Patient tolerated the procedure well with no immediate complications  Wrap/Hose:  Wraps        Flowsheet Data 3/14/2022   Procedure Start Time:  9:23 AM   Prep: Chloraprep   Side: Bilateral   Tx Length (cm): RT GSV: 30.5   Junction (cm): RT GSV: 2.39   RF Cycles: RT GSV: 8   RF TX Time (Minutes): RT GSV: 2:33   # PHLEB Sites: RIGHT 24. LEFT 5   Sedation taken: Yes   Pre Pt. Physical / Cognitive Limitations: WNL   TOTAL Local anesthesia Injected (ml): 3   Max Volume Local Anesthesia (ml): 11   TOTAL Tumescent Injected volume (ml): 475   Max Volume Tumescent (ml): 572   Post Pt. Physical / Cognitive Limitations: WNL   Procedure End Time: 10:44 AM   D/C Instructions given, states readiness to leave and escorted to car: Yes       ARTURO Edwards MD    Dictated using Dragon voice recognition software which may result in transcription errors      Again, thank you for allowing me to participate in the care of your patient.        Sincerely,        Yannick Edwards MD

## 2022-03-16 LAB — HCV RNA SERPL NAA+PROBE-ACNC: NOT DETECTED IU/ML

## 2022-03-17 ENCOUNTER — ANCILLARY PROCEDURE (OUTPATIENT)
Dept: ULTRASOUND IMAGING | Facility: CLINIC | Age: 62
End: 2022-03-17
Attending: SURGERY
Payer: COMMERCIAL

## 2022-03-17 ENCOUNTER — OFFICE VISIT (OUTPATIENT)
Dept: VASCULAR SURGERY | Facility: CLINIC | Age: 62
End: 2022-03-17
Attending: SURGERY
Payer: COMMERCIAL

## 2022-03-17 ENCOUNTER — TELEPHONE (OUTPATIENT)
Dept: VASCULAR SURGERY | Facility: CLINIC | Age: 62
End: 2022-03-17

## 2022-03-17 DIAGNOSIS — Z09 POSTOP CHECK: Primary | ICD-10-CM

## 2022-03-17 DIAGNOSIS — I83.811 VARICOSE VEINS OF RIGHT LOWER EXTREMITY WITH PAIN: ICD-10-CM

## 2022-03-17 PROCEDURE — 99207 PR NO CHARGE NURSE ONLY: CPT

## 2022-03-17 PROCEDURE — 93971 EXTREMITY STUDY: CPT | Mod: RT | Performed by: SURGERY

## 2022-03-17 NOTE — TELEPHONE ENCOUNTER
Spoke with patient regarding a work note verification form that he needed signed at his 72 hour appt in [place] today. Dr. Edwards signed form and the form was emailed to patient. Patient was not at home to verify that he received the e-mail at this time. Patient is going to either call the clinic back today or tomorrow to confirm he received it.

## 2022-03-17 NOTE — PROGRESS NOTES
"    Vein Clinic Postoperative Nurse Note    Patient is here for their 48 hour postoperative visit.    Procedure: Right leg VNUS closure GSV(med nec), Right leg 10-20 stab phlebs(med nec), Left leg 0.5 unit phlebs($)  Procedure Date: 3/14/22  Surgeon: Dr. Edwards    Ultrasound Result: The right GSV is closed 16.0mm from the SFJ to the proximal calf with thrombus throughout. No evidence of RLE DVT.    Physical Exam: Incisions are approximated without signs of infection.  Ecchymosis: moderate  Swelling: minimal  Paresthesia: pt stated he does have a little numbness to his right anterior proximal lower leg and a little to his right anterior ankle. Pt stated it is VERY MINIMAL and described it as \"pins and needles\" feeling to these areas this morning.    Patient Questions or Concerns: Pt is doing well and has no concerns.    Pt donned his thigh high compression hose.    Forwarded pt's work form to [place] office for nurse to have Dr. Edwards sign and then email back to pt.    Reviewed postoperative instructions with patient and provided them with written material of common things to expect from their procedure.     Patient's Next Vein Clinic Appointment: 6 week post op with Dr. Edwards (5/9/22).    Sondra Noble RN  "

## 2022-03-17 NOTE — LETTER
"    3/17/2022         RE: Duran Garner  [facility] [address]        Dear Colleague,    Thank you for referring your patient, Duran Garner, to the [facility] VEIN CLINIC [place]. Please see a copy of my visit note below.        Vein Clinic Postoperative Nurse Note    Patient is here for their 48 hour postoperative visit.    Procedure: Right leg VNUS closure GSV(med nec), Right leg 10-20 stab phlebs(med nec), Left leg 0.5 unit phlebs($)  Procedure Date: 3/14/22  Surgeon: Dr. Edwards    Ultrasound Result: The right GSV is closed 16.0mm from the SFJ to the proximal calf with thrombus throughout. No evidence of RLE DVT.    Physical Exam: Incisions are approximated without signs of infection.  Ecchymosis: moderate  Swelling: minimal  Paresthesia: pt stated he does have a little numbness to his right anterior proximal lower leg and a little to his right anterior ankle. Pt stated it is VERY MINIMAL and described it as \"pins and needles\" feeling to these areas this morning.    Patient Questions or Concerns: Pt is doing well and has no concerns.    Pt donned his thigh high compression hose.    Forwarded pt's work form to [place] office for nurse to have Dr. Edwards sign and then email back to pt.    Reviewed postoperative instructions with patient and provided them with written material of common things to expect from their procedure.     Patient's Next Vein Clinic Appointment: 6 week post op with Dr. Edwards (5/9/22).    Sondra Noble RN      Again, thank you for allowing me to participate in the care of your patient.        Sincerely,         Vein Nurse    "

## 2022-03-17 NOTE — PATIENT INSTRUCTIONS
Vein Closure (Ablation)  Common Things to Expect    - Small lumps may develop beneath phlebectomy sites and the site where the vein closure device was inserted.  This is a normal step in healing.  These should not be painful, but may be tender to the touch. It can take 6 weeks to 3 months for these lumps/firmness to resolve.   - Bruising will look worse before it looks better and can last for 4-6 weeks.  - After about 10 days, you may notice tightness/pulling on the inside thigh and knee. As your ablated vein is healing, it contracts, causing a tightness or pulling sensation. This may last for several weeks, but will resolve. Treat it with Ibuprofen or Advil.  - Numbness will get better with time, but may take 3 months to a year to resolve.  - You may notice that the skin on your legs has become ultra-sensitive to touch. For example, the weight of your sheets may feel painful. This usually resolves in 6 weeks.  - Ankle swelling is not uncommon and may last 4-6 weeks.   - To get optimal results from your procedure, wearing your compression hose is key for the first 7 days. This is necessary to ensure proper closure of the ablated vein.  - For 2 weeks, no weight lifting over 25lbs, no running, and no vigorous aerobic exercise. After this time, ease back into your normal activities. If you do too much too soon, you will have more pain and bruising and possibly re-open the vein that was closed. It takes about 2 weeks for the ablated vein to permanently close. Keep in mind your body is still healing.  - For 2 weeks, do not shave your legs or use lotions, powders, creams to allow proper healing of phlebectomy sites and vein access sites.    Vein Removal (Phlebectomy)  Common Things to Expect     - Small lumps may develop beneath phlebectomy sites and the site where the vein closure device was inserted. This is a normal step in healing.  These should not be painful, but may be tender to the touch. It can take 6 weeks to  3 months for these lumps/firmness to resolve.  - Bruising will look worse before it looks better and can last for 4-6 weeks.  - Ankle swelling is not uncommon and may last 4-6 weeks.       If you are experiencing any of the following symptoms, please seek immediate medical attention at your local emergency department.  - Significant pain in the back of the calf possibly with difficulty walking  - Significant swelling and/or tenderness in the back of the calf  - Redness that continues to spread  - Chest pain and/or shortness of breath

## 2022-05-09 ENCOUNTER — OFFICE VISIT (OUTPATIENT)
Dept: VASCULAR SURGERY | Facility: CLINIC | Age: 62
End: 2022-05-09
Payer: COMMERCIAL

## 2022-05-09 DIAGNOSIS — I83.811 VARICOSE VEINS OF RIGHT LOWER EXTREMITY WITH PAIN: Primary | ICD-10-CM

## 2022-05-09 PROCEDURE — 99207 PR VEINSOLUTIONS POST OPERATIVE VISIT: CPT | Performed by: SURGERY

## 2022-05-09 NOTE — LETTER
5/9/2022         RE: Duran Garner  [address] [address]        Dear Colleague,    Thank you for referring your patient, Duran Garner, to the [facility] VEIN CLINIC [place]. Please see a copy of my visit note below.    [facility] vein [facility] 6-week postop note  Duran Garner returns in follow-up of radiofrequency ablation of his right great saphenous vein with phlebectomies and left lower extremity phlebectomies on 3/14/2022.  He states his right leg pain is much improved as is the swelling.  He is surprised that his right calf is now essentially the same size as his left.  He admits to some pulling in the medial thigh, along the course of the treated great saphenous vein.  The numbness that he had on the distal medial right leg has dissipated.    Exam  No residual varicose veins on either lower extremity.  Mild induration along some of the phlebectomy sites, especially on the posterior right calf.  No significant ankle edema in either lower extremity.    Assessment/plan  Overall doing well and quite pleased with the results.  He will return for 6-month post procedure ultrasound.  He knows to contact us sooner if he has other concerns or questions.    ARTURO Edwards MD    Dictated using Dragon voice recognition software which may result in transcription errors      Again, thank you for allowing me to participate in the care of your patient.        Sincerely,        Yannick Edwards MD

## 2022-05-09 NOTE — PROGRESS NOTES
[facility] vein clinic [place] 6-week postop note  Duran Garner returns in follow-up of radiofrequency ablation of his right great saphenous vein with phlebectomies and left lower extremity phlebectomies on 3/14/2022.  He states his right leg pain is much improved as is the swelling.  He is surprised that his right calf is now essentially the same size as his left.  He admits to some pulling in the medial thigh, along the course of the treated great saphenous vein.  The numbness that he had on the distal medial right leg has dissipated.    Exam  No residual varicose veins on either lower extremity.  Mild induration along some of the phlebectomy sites, especially on the posterior right calf.  No significant ankle edema in either lower extremity.    Assessment/plan  Overall doing well and quite pleased with the results.  He will return for 6-month post procedure ultrasound.  He knows to contact us sooner if he has other concerns or questions.    ARTURO Edwards MD    Dictated using Dragon voice recognition software which may result in transcription errors

## 2022-05-19 ASSESSMENT — ENCOUNTER SYMPTOMS
DIARRHEA: 0
ABDOMINAL PAIN: 0
NAUSEA: 0
PALPITATIONS: 0
DYSURIA: 0
WEAKNESS: 0
SORE THROAT: 0
PARESTHESIAS: 0
FREQUENCY: 0
CHILLS: 0
EYE PAIN: 0
HEMATOCHEZIA: 0
SHORTNESS OF BREATH: 0
HEADACHES: 0
CONSTIPATION: 0
COUGH: 0
FEVER: 0
DIZZINESS: 0
JOINT SWELLING: 0
MYALGIAS: 0
ARTHRALGIAS: 0
NERVOUS/ANXIOUS: 0
HEARTBURN: 0
HEMATURIA: 0

## 2022-05-20 ENCOUNTER — OFFICE VISIT (OUTPATIENT)
Dept: FAMILY MEDICINE | Facility: CLINIC | Age: 62
End: 2022-05-20
Payer: COMMERCIAL

## 2022-05-20 ENCOUNTER — TRANSFERRED RECORDS (OUTPATIENT)
Dept: HEALTH INFORMATION MANAGEMENT | Facility: CLINIC | Age: 62
End: 2022-05-20

## 2022-05-20 VITALS
SYSTOLIC BLOOD PRESSURE: 118 MMHG | RESPIRATION RATE: 16 BRPM | OXYGEN SATURATION: 98 % | TEMPERATURE: 97.5 F | WEIGHT: 180 LBS | HEART RATE: 57 BPM | BODY MASS INDEX: 26.97 KG/M2 | DIASTOLIC BLOOD PRESSURE: 82 MMHG

## 2022-05-20 DIAGNOSIS — Z12.11 SCREEN FOR COLON CANCER: ICD-10-CM

## 2022-05-20 DIAGNOSIS — Z12.5 SCREENING FOR PROSTATE CANCER: ICD-10-CM

## 2022-05-20 DIAGNOSIS — Z78.9 VARICELLA VACCINATION STATUS UNKNOWN: ICD-10-CM

## 2022-05-20 DIAGNOSIS — Z80.42 FAMILY HX OF PROSTATE CANCER: ICD-10-CM

## 2022-05-20 DIAGNOSIS — Z00.00 ROUTINE GENERAL MEDICAL EXAMINATION AT A HEALTH CARE FACILITY: Primary | ICD-10-CM

## 2022-05-20 LAB
VZV IGG SER QL IA: 1455 INDEX
VZV IGG SER QL IA: POSITIVE

## 2022-05-20 PROCEDURE — 80061 LIPID PANEL: CPT | Performed by: PHYSICIAN ASSISTANT

## 2022-05-20 PROCEDURE — 86787 VARICELLA-ZOSTER ANTIBODY: CPT | Performed by: PHYSICIAN ASSISTANT

## 2022-05-20 PROCEDURE — 99396 PREV VISIT EST AGE 40-64: CPT | Performed by: PHYSICIAN ASSISTANT

## 2022-05-20 PROCEDURE — 80053 COMPREHEN METABOLIC PANEL: CPT | Performed by: PHYSICIAN ASSISTANT

## 2022-05-20 PROCEDURE — G0103 PSA SCREENING: HCPCS | Performed by: PHYSICIAN ASSISTANT

## 2022-05-20 PROCEDURE — 36415 COLL VENOUS BLD VENIPUNCTURE: CPT | Performed by: PHYSICIAN ASSISTANT

## 2022-05-20 ASSESSMENT — ENCOUNTER SYMPTOMS
COUGH: 0
EYE PAIN: 0
SORE THROAT: 0
HEADACHES: 0
SHORTNESS OF BREATH: 0
DYSURIA: 0
JOINT SWELLING: 0
WEAKNESS: 0
CHILLS: 0
HEMATOCHEZIA: 0
DIZZINESS: 0
HEMATURIA: 0
HEARTBURN: 0
ARTHRALGIAS: 0
PALPITATIONS: 0
FREQUENCY: 0
PARESTHESIAS: 0
CONSTIPATION: 0
NAUSEA: 0
MYALGIAS: 0
DIARRHEA: 0
FEVER: 0
ABDOMINAL PAIN: 0
NERVOUS/ANXIOUS: 0

## 2022-05-20 ASSESSMENT — PAIN SCALES - GENERAL: PAINLEVEL: NO PAIN (0)

## 2022-05-20 NOTE — PROGRESS NOTES
SUBJECTIVE:   CC: Duran Garner is an 62 year old male who presents for preventative health visit.       Patient has been advised of split billing requirements and indicates understanding: Yes  Healthy Habits:     Getting at least 3 servings of Calcium per day:  Yes    Bi-annual eye exam:  Yes    Dental care twice a year:  NO    Sleep apnea or symptoms of sleep apnea:  None    Diet:  Regular (no restrictions)    Frequency of exercise:  2-3 days/week    Duration of exercise:  15-30 minutes    Taking medications regularly:  Yes    Medication side effects:  Not applicable    PHQ-2 Total Score: 0    Additional concerns today:  No    Duran Garner is a 62 year old male who presents today for annual check up  Did recently have varicose vein surgery in March; went well  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 Yes        Social History     Tobacco Use     Smoking status: Never Smoker     Smokeless tobacco: Never Used   Substance Use Topics     Alcohol use: Yes     Alcohol/week: 0.0 - 0.8 standard drinks     If you drink alcohol do you typically have >3 drinks per day or >7 drinks per week? No    Alcohol Use 5/20/2022   Prescreen: >3 drinks/day or >7 drinks/week? -   Prescreen: >3 drinks/day or >7 drinks/week? No       Last PSA:   PSA   Date Value Ref Range Status   12/31/2020 0.65 0 - 4 ug/L Final     Comment:     Assay Method:  Chemiluminescence using Siemens Vista analyzer       Reviewed  orders with patient. Reviewed health maintenance and updated orders accordingly - Yes  Lab work is in process    Reviewed and updated as needed this visit by clinical staff   Tobacco  Allergies                 Reviewed and updated as needed this visit by Provider                    Review of Systems   Constitutional: Negative for chills and fever.   HENT: Negative for congestion, ear pain, hearing loss and sore throat.    Eyes: Negative for pain and visual disturbance.   Respiratory: Negative for cough and shortness of breath.    Cardiovascular: Negative for chest pain, palpitations and peripheral edema.   Gastrointestinal: Negative for abdominal pain, constipation, diarrhea, heartburn, hematochezia and nausea.   Genitourinary: Negative for dysuria, frequency, genital sores, hematuria, impotence, penile discharge and urgency.   Musculoskeletal: Negative for arthralgias, joint swelling and myalgias.   Skin: Negative for rash.   Neurological: Negative for dizziness, weakness, headaches and paresthesias.   Psychiatric/Behavioral: Negative for mood changes. The patient is not nervous/anxious.        OBJECTIVE:   /82   Pulse 57   Temp 97.5  F (36.4  C) (Oral)   Resp 16   Wt 81.6 kg (180 lb)   SpO2 98%   BMI 26.97 kg/m      Physical Exam  GENERAL: healthy, alert and no distress  EYES: Eyes grossly normal to inspection, PERRL and conjunctivae and sclerae normal  HENT: ear canals and TM's normal, nose and mouth without ulcers or lesions  NECK: no adenopathy, no asymmetry, masses, or scars and thyroid normal to palpation  RESP: lungs clear to auscultation - no rales, rhonchi or wheezes  CV: regular rate and rhythm, normal S1 S2, no S3 or S4, no murmur, click or rub, no peripheral edema and peripheral pulses strong  ABDOMEN: soft, nontender, no hepatosplenomegaly, no masses and bowel sounds normal  MS: there is some pain in the right buttock with internal and external hip stressing; negative SLR  SKIN: numerous  "nevi and seb k - seeing derm today  NEURO: Normal strength and tone, mentation intact and speech normal  PSYCH: mentation appears normal, affect normal/bright    Diagnostic Test Results:  Labs reviewed in Epic    ASSESSMENT/PLAN:   1. Routine general medical examination at a health care facility  Reviewed personal and family history. Reviewed age appropriate screenings. Recommended any needed vaccinations. He does defer Tdap today  - Comprehensive metabolic panel (BMP + Alb, Alk Phos, ALT, AST, Total. Bili, TP); Future  - Lipid panel reflex to direct LDL Fasting; Future  - Comprehensive metabolic panel (BMP + Alb, Alk Phos, ALT, AST, Total. Bili, TP)  - Lipid panel reflex to direct LDL Fasting    2. Screen for colon cancer  Due  - Fecal colorectal cancer screen FIT - Future (S+30); Future  - Fecal colorectal cancer screen FIT - Future (S+30)    3. Varicella vaccination status unknown  He'd like to screen prior to getting shingles vaccine  - Varicella Zoster Virus Antibody IgG; Future  - Varicella Zoster Virus Antibody IgG    4. Family hx of prostate cancer  5. Screening for prostate cancer  Updating.  - PSA, screen; Future  - PSA, screen        COUNSELING:   Reviewed preventive health counseling, as reflected in patient instructions    Estimated body mass index is 26.97 kg/m  as calculated from the following:    Height as of 12/31/20: 1.74 m (5' 8.5\").    Weight as of this encounter: 81.6 kg (180 lb).       He reports that he has never smoked. He has never used smokeless tobacco.      Counseling Resources:  ATP IV Guidelines  Pooled Cohorts Equation Calculator  FRAX Risk Assessment  ICSI Preventive Guidelines  Dietary Guidelines for Americans, 2010  USDA's MyPlate  ASA Prophylaxis  Lung CA Screening    Usama Interiano PA-C  [facility]  "

## 2022-05-20 NOTE — PATIENT INSTRUCTIONS
Come back for the tetanus shot!!        Preventive Health Recommendations  Male Ages 50 - 64    Yearly exam:             See your health care provider every year in order to  o   Review health changes.   o   Discuss preventive care.    o   Review your medicines if your doctor has prescribed any.   Have a cholesterol test every 5 years, or more frequently if you are at risk for high cholesterol/heart disease.   Have a diabetes test (fasting glucose) every three years. If you are at risk for diabetes, you should have this test more often.   Have a colonoscopy at age 50, or have a yearly FIT test (stool test). These exams will check for colon cancer.    Talk with your health care provider about whether or not a prostate cancer screening test (PSA) is right for you.  You should be tested each year for STDs (sexually transmitted diseases), if you re at risk.     Shots: Get a flu shot each year. Get a tetanus shot every 10 years.     Nutrition:  Eat at least 5 servings of fruits and vegetables daily.   Eat whole-grain bread, whole-wheat pasta and brown rice instead of white grains and rice.   Get adequate Calcium and Vitamin D.     Lifestyle  Exercise for at least 150 minutes a week (30 minutes a day, 5 days a week). This will help you control your weight and prevent disease.   Limit alcohol to one drink per day.   No smoking.   Wear sunscreen to prevent skin cancer.   See your dentist every six months for an exam and cleaning.   See your eye doctor every 1 to 2 years.

## 2022-05-21 LAB
ALBUMIN SERPL-MCNC: 3.8 G/DL (ref 3.4–5)
ALP SERPL-CCNC: 78 U/L (ref 40–150)
ALT SERPL W P-5'-P-CCNC: 26 U/L (ref 0–70)
ANION GAP SERPL CALCULATED.3IONS-SCNC: 7 MMOL/L (ref 3–14)
AST SERPL W P-5'-P-CCNC: 22 U/L (ref 0–45)
BILIRUB SERPL-MCNC: 0.5 MG/DL (ref 0.2–1.3)
BUN SERPL-MCNC: 17 MG/DL (ref 7–30)
CALCIUM SERPL-MCNC: 9 MG/DL (ref 8.5–10.1)
CHLORIDE BLD-SCNC: 108 MMOL/L (ref 94–109)
CHOLEST SERPL-MCNC: 165 MG/DL
CO2 SERPL-SCNC: 27 MMOL/L (ref 20–32)
CREAT SERPL-MCNC: 0.92 MG/DL (ref 0.66–1.25)
FASTING STATUS PATIENT QL REPORTED: YES
GFR SERPL CREATININE-BSD FRML MDRD: >90 ML/MIN/1.73M2
GLUCOSE BLD-MCNC: 90 MG/DL (ref 70–99)
HDLC SERPL-MCNC: 56 MG/DL
LDLC SERPL CALC-MCNC: 91 MG/DL
NONHDLC SERPL-MCNC: 109 MG/DL
POTASSIUM BLD-SCNC: 4.3 MMOL/L (ref 3.4–5.3)
PROT SERPL-MCNC: 7.4 G/DL (ref 6.8–8.8)
PSA SERPL-MCNC: 0.58 UG/L (ref 0–4)
SODIUM SERPL-SCNC: 142 MMOL/L (ref 133–144)
TRIGL SERPL-MCNC: 91 MG/DL

## 2022-08-29 ENCOUNTER — HOSPITAL ENCOUNTER (OUTPATIENT)
Facility: CLINIC | Age: 62
Discharge: HOME | End: 2022-08-29
Admitting: PHYSICIAN ASSISTANT
Payer: COMMERCIAL

## 2022-08-29 ENCOUNTER — LAB (OUTPATIENT)
Dept: LAB | Facility: CLINIC | Age: 62
End: 2022-08-29
Payer: COMMERCIAL

## 2022-08-29 PROCEDURE — 82274 ASSAY TEST FOR BLOOD FECAL: CPT | Performed by: PHYSICIAN ASSISTANT

## 2022-09-02 LAB — HEMOCCULT STL QL IA: NEGATIVE

## 2022-10-10 ENCOUNTER — HEALTH MAINTENANCE LETTER (OUTPATIENT)
Age: 62
End: 2022-10-10

## 2022-11-14 ENCOUNTER — IMMUNIZATION (OUTPATIENT)
Dept: FAMILY MEDICINE | Facility: CLINIC | Age: 62
End: 2022-11-14
Payer: COMMERCIAL

## 2022-11-14 PROCEDURE — 90682 RIV4 VACC RECOMBINANT DNA IM: CPT

## 2022-11-14 PROCEDURE — 90471 IMMUNIZATION ADMIN: CPT

## 2022-11-17 ENCOUNTER — OFFICE VISIT (OUTPATIENT)
Dept: VASCULAR SURGERY | Facility: CLINIC | Age: 62
End: 2022-11-17
Attending: SURGERY
Payer: COMMERCIAL

## 2022-11-17 ENCOUNTER — ANCILLARY PROCEDURE (OUTPATIENT)
Dept: ULTRASOUND IMAGING | Facility: CLINIC | Age: 62
End: 2022-11-17
Attending: SURGERY
Payer: COMMERCIAL

## 2022-11-17 DIAGNOSIS — I83.811 VARICOSE VEINS OF RIGHT LOWER EXTREMITY WITH PAIN: Primary | ICD-10-CM

## 2022-11-17 DIAGNOSIS — I83.811 VARICOSE VEINS OF RIGHT LOWER EXTREMITY WITH PAIN: ICD-10-CM

## 2022-11-17 PROCEDURE — 93971 EXTREMITY STUDY: CPT | Mod: RT | Performed by: SURGERY

## 2022-11-17 PROCEDURE — 99213 OFFICE O/P EST LOW 20 MIN: CPT | Performed by: SURGERY

## 2022-11-17 NOTE — PROGRESS NOTES
[facility] [place] long-term post VNUS Closure follow-up  Duran Garner returns in follow-up of radiofrequency ablation of his right great saphenous vein with medically necessary phlebectomies right lower extremity and cosmetic phlebectomies left lower extremity on 3/14/2022 overall he is doing well and says he is having no right leg pain, that the swelling has dissipated and that the right calf is doing the same size as the left.    Exam  Right lower extremity: No residual varicose veins.  Mild stasis hyperpigmentation of the distal medial right leg (present prior to treatment).    Left lower extremity: No significant varicose veins    Ultrasound  Exam: Ultrasound of the deep venous system of right lower extremity  dated 11/17/2022 10:45 AM     Clinical information: Varicose veins of right lower extremity with  pain      Comparison: 3/17/2022     Ordering provider: SHONDA MCKEON     Technique: Gray-scale evaluation with compression and Doppler  assessment of deep venous system for spontaneous and phasic flow, as  well as the presence of distal augmentation. Color flow images  obtained as needed. Gray-scale images with compression of the great  saphenous vein obtained as needed.     Findings:     Right leg:     CFV: Thrombus: No, Phasic: Yes  Femoral vein, proximal: Thrombus: No, Phasic: Yes  Femoral vein, mid: Thrombus: No, Phasic: Yes  Femoral vein, distal: Thrombus: No, Phasic: Yes  Popliteal vein: Thrombus: No, Phasic: Yes  PTV: Thrombus: No  Peroneal vein: Thrombus: No     There is occlusive superficial vein thrombus in the great saphenous  vein extending from the distal thigh to 1.4 cm inferior to the  saphenofemoral junction                                                                      Impression:     1. No evidence of deep venous thrombosis in the right lower extremity.  2.  Non-compressible right great saphenous vein from the distal thigh  to within 1.4 cm of  the  saphenofemoral junction consistent with  post-op superficial venous intervention on the great saphenous vein.     EDWIGE CONLEY MD    Assessment  Good result following radiofrequency ablation of the right great saphenous vein with phlebectomies.  He is currently asymptomatic.  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    Plan  Return on an as-needed basis.  Continued exercise, control your weight and wear compression.    C Diego Edwards MD, FACS    Dictated using Dragon voice recognition software which may result in transcription errors

## 2022-11-17 NOTE — LETTER
11/17/2022         RE: Duran Garner  [address] [address]        Dear Colleague,    Thank you for referring your patient, Duran Garner, to the [facility] [place]. Please see a copy of my visit note below.    [facility] long-term post VNUS Closure follow-up  Duran Garner returns in follow-up of radiofrequency ablation of his right great saphenous vein with medically necessary phlebectomies right lower extremity and cosmetic phlebectomies left lower extremity on 3/14/2022 overall he is doing well and says he is having no right leg pain, that the swelling has dissipated and that the right calf is doing the same size as the left.    Exam  Right lower extremity: No residual varicose veins.  Mild stasis hyperpigmentation of the distal medial right leg (present prior to treatment).    Left lower extremity: No significant varicose veins    Ultrasound  Exam: Ultrasound of the deep venous system of right lower extremity  dated 11/17/2022 10:45 AM     Clinical information: Varicose veins of right lower extremity with  pain      Comparison: 3/17/2022     Ordering provider: SHONDA MCKEON     Technique: Gray-scale evaluation with compression and Doppler  assessment of deep venous system for spontaneous and phasic flow, as  well as the presence of distal augmentation. Color flow images  obtained as needed. Gray-scale images with compression of the great  saphenous vein obtained as needed.     Findings:     Right leg:     CFV: Thrombus: No, Phasic: Yes  Femoral vein, proximal: Thrombus: No, Phasic: Yes  Femoral vein, mid: Thrombus: No, Phasic: Yes  Femoral vein, distal: Thrombus: No, Phasic: Yes  Popliteal vein: Thrombus: No, Phasic: Yes  PTV: Thrombus: No  Peroneal vein: Thrombus: No     There is occlusive superficial vein thrombus in the great saphenous  vein extending from the distal thigh to 1.4 cm inferior to the  saphenofemoral junction                                                                       Impression:     1. No evidence of deep venous thrombosis in the right lower extremity.  2.  Non-compressible right great saphenous vein from the distal thigh  to within 1.4 cm of  the saphenofemoral junction consistent with  post-op superficial venous intervention on the great saphenous vein.     EDWIGE CONLEY MD    Assessment  Good result following radiofrequency ablation of the right great saphenous vein with phlebectomies.  He is currently asymptomatic.  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    Plan  Return on an as-needed basis.  Continued exercise, control your weight and wear compression.    C Diego Edwards MD, FACS    Dictated using Dragon voice recognition software which may result in transcription errors         Again, thank you for allowing me to participate in the care of your patient.        Sincerely,        Yannick Edwards MD

## 2023-04-20 ENCOUNTER — PATIENT OUTREACH (OUTPATIENT)
Dept: CARE COORDINATION | Facility: CLINIC | Age: 63
End: 2023-04-20
Payer: COMMERCIAL

## 2023-05-04 ENCOUNTER — PATIENT OUTREACH (OUTPATIENT)
Dept: CARE COORDINATION | Facility: CLINIC | Age: 63
End: 2023-05-04
Payer: COMMERCIAL

## 2023-08-19 ENCOUNTER — HEALTH MAINTENANCE LETTER (OUTPATIENT)
Age: 63
End: 2023-08-19

## 2023-10-04 ENCOUNTER — IMMUNIZATION (OUTPATIENT)
Dept: FAMILY MEDICINE | Facility: CLINIC | Age: 63
End: 2023-10-04
Payer: COMMERCIAL

## 2023-10-04 ENCOUNTER — TRANSFERRED RECORDS (OUTPATIENT)
Dept: HEALTH INFORMATION MANAGEMENT | Facility: CLINIC | Age: 63
End: 2023-10-04

## 2023-10-04 PROCEDURE — 90682 RIV4 VACC RECOMBINANT DNA IM: CPT

## 2023-10-04 PROCEDURE — 90471 IMMUNIZATION ADMIN: CPT

## 2023-12-22 ENCOUNTER — OFFICE VISIT (OUTPATIENT)
Dept: FAMILY MEDICINE | Facility: CLINIC | Age: 63
End: 2023-12-22
Payer: COMMERCIAL

## 2023-12-22 VITALS
HEART RATE: 87 BPM | SYSTOLIC BLOOD PRESSURE: 104 MMHG | OXYGEN SATURATION: 97 % | BODY MASS INDEX: 28.13 KG/M2 | HEIGHT: 68 IN | DIASTOLIC BLOOD PRESSURE: 75 MMHG | TEMPERATURE: 96.9 F | WEIGHT: 185.6 LBS

## 2023-12-22 DIAGNOSIS — R07.89 ATYPICAL CHEST PAIN: ICD-10-CM

## 2023-12-22 DIAGNOSIS — M94.0 COSTOCHONDRITIS: ICD-10-CM

## 2023-12-22 DIAGNOSIS — S29.011A PECTORALIS MUSCLE STRAIN, INITIAL ENCOUNTER: Primary | ICD-10-CM

## 2023-12-22 PROCEDURE — 99214 OFFICE O/P EST MOD 30 MIN: CPT | Performed by: INTERNAL MEDICINE

## 2023-12-22 ASSESSMENT — PAIN SCALES - GENERAL: PAINLEVEL: NO PAIN (0)
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
## 2023-12-26 ENCOUNTER — HOSPITAL ENCOUNTER (OUTPATIENT)
Dept: CT IMAGING | Facility: CLINIC | Age: 63
Discharge: HOME | End: 2023-12-26
Attending: INTERNAL MEDICINE | Admitting: INTERNAL MEDICINE
Payer: COMMERCIAL

## 2023-12-26 DIAGNOSIS — M94.0 COSTOCHONDRITIS: ICD-10-CM

## 2023-12-26 PROCEDURE — 71250 CT THORAX DX C-: CPT

## 2024-01-12 ASSESSMENT — ENCOUNTER SYMPTOMS
EYE PAIN: 0
CHILLS: 0
DIZZINESS: 0
NERVOUS/ANXIOUS: 0
HEMATURIA: 0
SORE THROAT: 0
HEMATOCHEZIA: 0
MYALGIAS: 0
NAUSEA: 0
SHORTNESS OF BREATH: 0
FEVER: 0
CONSTIPATION: 0
ARTHRALGIAS: 0
FREQUENCY: 0
COUGH: 0
JOINT SWELLING: 0
ABDOMINAL PAIN: 0
DIARRHEA: 0
WEAKNESS: 0
PARESTHESIAS: 0
HEADACHES: 0
PALPITATIONS: 0
DYSURIA: 0
HEARTBURN: 0

## 2024-01-18 ENCOUNTER — OFFICE VISIT (OUTPATIENT)
Dept: FAMILY MEDICINE | Facility: CLINIC | Age: 64
End: 2024-01-18
Payer: COMMERCIAL

## 2024-01-18 VITALS
TEMPERATURE: 97.6 F | OXYGEN SATURATION: 98 % | WEIGHT: 181 LBS | SYSTOLIC BLOOD PRESSURE: 129 MMHG | HEART RATE: 68 BPM | RESPIRATION RATE: 13 BRPM | HEIGHT: 68 IN | BODY MASS INDEX: 27.43 KG/M2 | DIASTOLIC BLOOD PRESSURE: 85 MMHG

## 2024-01-18 DIAGNOSIS — R07.89 ATYPICAL CHEST PAIN: ICD-10-CM

## 2024-01-18 DIAGNOSIS — R93.89 ABNORMAL CHEST CT: ICD-10-CM

## 2024-01-18 DIAGNOSIS — Z00.00 ROUTINE GENERAL MEDICAL EXAMINATION AT A HEALTH CARE FACILITY: Primary | ICD-10-CM

## 2024-01-18 DIAGNOSIS — Z12.5 SCREENING FOR PROSTATE CANCER: ICD-10-CM

## 2024-01-18 DIAGNOSIS — Z12.11 SCREEN FOR COLON CANCER: ICD-10-CM

## 2024-01-18 DIAGNOSIS — Z80.42 FAMILY HX OF PROSTATE CANCER: ICD-10-CM

## 2024-01-18 PROCEDURE — 90715 TDAP VACCINE 7 YRS/> IM: CPT | Performed by: PHYSICIAN ASSISTANT

## 2024-01-18 PROCEDURE — 90471 IMMUNIZATION ADMIN: CPT | Performed by: PHYSICIAN ASSISTANT

## 2024-01-18 PROCEDURE — 99396 PREV VISIT EST AGE 40-64: CPT | Mod: 25 | Performed by: PHYSICIAN ASSISTANT

## 2024-01-18 PROCEDURE — 99214 OFFICE O/P EST MOD 30 MIN: CPT | Mod: 25 | Performed by: PHYSICIAN ASSISTANT

## 2024-01-18 ASSESSMENT — ENCOUNTER SYMPTOMS
PALPITATIONS: 0
HEADACHES: 0
FEVER: 0
CONSTIPATION: 0
ARTHRALGIAS: 0
COUGH: 0
DYSURIA: 0
SORE THROAT: 0
NERVOUS/ANXIOUS: 0
SHORTNESS OF BREATH: 0
EYE PAIN: 0
FREQUENCY: 0
JOINT SWELLING: 0
MYALGIAS: 0
DIARRHEA: 0
DIZZINESS: 0
ABDOMINAL PAIN: 0
WEAKNESS: 0
HEMATURIA: 0
NAUSEA: 0
CHILLS: 0

## 2024-01-18 ASSESSMENT — PAIN SCALES - GENERAL: PAINLEVEL: NO PAIN (0)

## 2024-01-18 NOTE — PROGRESS NOTES
Preventive Care Visit  [facility] NELSY Interiano PA-C, Family Medicine  Jan 18, 2024      SUBJECTIVE:   Efrain is a 64 year old, presenting for the following:  Physical        1/18/2024     2:04 PM   Additional Questions   Roomed by MR   Accompanied by NA         1/18/2024     2:04 PM   Patient Reported Additional Medications   Patient reports taking the following new medications NA     Healthy Habits:     Getting at least 3 servings of Calcium per day:  NO    Bi-annual eye exam:  Yes    Dental care twice a year:  Yes    Sleep apnea or symptoms of sleep apnea:  None    Diet:  Regular (no restrictions)    Frequency of exercise:  4-5 days/week    Duration of exercise:  30-45 minutes    Taking medications regularly:  Yes    Medication side effects:  Not applicable    Additional concerns today:  No      NOT fasting   Will do only Dtap today       Chest Concern  About 6 months ago started getting a strange feeling in his chest.  -About a year at [facility] he also did a Cardiac CT   -score of 39 in the LAD  Then last summer developed an abnormal sensation in the torso or chest  Not painful, just unusual  Seemed to move around  Not associated with movement  It moved around his entire torso. Mostly in sternum area now  Had CT of Sternum and was dx with arthritis  --after this mentioning he can worsen with movement        Today's PHQ-2 Score:       1/18/2024     1:58 PM   PHQ-2 ( 1999 [employer])   Q1: Little interest or pleasure in doing things 0   Q2: Feeling down, depressed or hopeless 0   PHQ-2 Score 0   Q1: Little interest or pleasure in doing things Not at all   Q2: Feeling down, depressed or hopeless Not at all   PHQ-2 Score 0           Social History     Tobacco Use    Smoking status: Never     Passive exposure: Never    Smokeless tobacco: Never   Substance Use Topics    Alcohol use: Yes     Alcohol/week: 0.0 - 0.8 standard drinks of alcohol             1/12/2024     4:41 PM   Alcohol Use  "  Prescreen: >3 drinks/day or >7 drinks/week? No       Last PSA:   PSA   Date Value Ref Range Status   12/31/2020 0.65 0 - 4 ug/L Final     Comment:     Assay Method:  Chemiluminescence using Siemens Vista analyzer     Prostate Specific Antigen Screen   Date Value Ref Range Status   05/20/2022 0.58 0.00 - 4.00 ug/L Final       Reviewed orders with patient. Reviewed health maintenance and updated orders accordingly - Yes  Lab work is in process  Labs reviewed in EPIC    Reviewed and updated as needed this visit by clinical staff   Tobacco  Allergies  Meds              Reviewed and updated as needed this visit by Provider                    Review of Systems   Constitutional:  Negative for chills and fever.   HENT:  Negative for congestion, ear pain, hearing loss and sore throat.    Eyes:  Negative for pain and visual disturbance.   Respiratory:  Negative for cough and shortness of breath.    Cardiovascular:  Negative for chest pain and palpitations.   Gastrointestinal:  Negative for abdominal pain, constipation, diarrhea and nausea.   Genitourinary:  Negative for dysuria, frequency, genital sores, hematuria, penile discharge and urgency.   Musculoskeletal:  Negative for arthralgias, joint swelling and myalgias.   Skin:  Negative for rash.   Neurological:  Negative for dizziness, weakness and headaches.   Psychiatric/Behavioral:  The patient is not nervous/anxious.          OBJECTIVE:   /85 (BP Location: Right arm, Patient Position: Sitting, Cuff Size: Adult Large)   Pulse 68   Temp 97.6  F (36.4  C) (Oral)   Resp 13   Ht 1.727 m (5' 8\")   Wt 82.1 kg (181 lb)   SpO2 98%   BMI 27.52 kg/m     Estimated body mass index is 27.52 kg/m  as calculated from the following:    Height as of this encounter: 1.727 m (5' 8\").    Weight as of this encounter: 82.1 kg (181 lb).  Physical Exam  GENERAL: alert and no distress  EYES: Eyes grossly normal to inspection, PERRL and conjunctivae and sclerae normal  HENT: ear " "canals and TM's normal, nose and mouth without ulcers or lesions  NECK: no adenopathy, no asymmetry, masses, or scars  RESP: lungs clear to auscultation - no rales, rhonchi or wheezes  CV: regular rate and rhythm, normal S1 S2, no S3 or S4, no murmur, click or rub, no peripheral edema  ABDOMEN: soft, nontender, no hepatosplenomegaly, no masses and bowel sounds normal  MS: No peripheral edema   SKIN: no suspicious lesions or rashes  PSYCH: mentation appears normal, affect normal/bright    Diagnostic Test Results:  Labs reviewed in Epic  Reviewed previous CT CAC    ASSESSMENT/PLAN:   Routine general medical examination at a health care facility  Reviewed personal and family history. Reviewed age appropriate screenings. Recommended any needed vaccinations. Accepts Tdap    Atypical chest pain  This does not sound cardiac, and he regularly exercises without pain, but given his noted coronary disease, we'll start stress EKG screen  - Exercise Stress Test - Adult; Future  - Lipid panel reflex to direct LDL Fasting; Future  - Basic metabolic panel  (Ca, Cl, CO2, Creat, Gluc, K, Na, BUN); Future    Abnormal chest CT  He has known LAD disease. He declines aspirin and statin  - Exercise Stress Test - Adult; Future  - Lipid panel reflex to direct LDL Fasting; Future    Screen for colon cancer  due  - Fecal colorectal cancer screen (FIT); Future    Family hx of prostate cancer  Screening for prostate cancer  Updating  - PSA, screen; Future        Counseling  Reviewed preventive health counseling, as reflected in patient instructions      BMI  Estimated body mass index is 27.52 kg/m  as calculated from the following:    Height as of this encounter: 1.727 m (5' 8\").    Weight as of this encounter: 82.1 kg (181 lb).         He reports that he has never smoked. He has never been exposed to tobacco smoke. He has never used smokeless tobacco.            Signed Electronically by: Usama Interiano PA-C  "

## 2024-01-23 ENCOUNTER — LAB (OUTPATIENT)
Dept: LAB | Facility: CLINIC | Age: 64
End: 2024-01-23
Payer: COMMERCIAL

## 2024-01-23 DIAGNOSIS — R07.89 ATYPICAL CHEST PAIN: ICD-10-CM

## 2024-01-23 DIAGNOSIS — R93.89 ABNORMAL CHEST CT: ICD-10-CM

## 2024-01-23 DIAGNOSIS — Z12.5 SCREENING FOR PROSTATE CANCER: ICD-10-CM

## 2024-01-23 DIAGNOSIS — Z80.42 FAMILY HX OF PROSTATE CANCER: ICD-10-CM

## 2024-01-23 LAB
ANION GAP SERPL CALCULATED.3IONS-SCNC: 7 MMOL/L (ref 7–15)
BUN SERPL-MCNC: 12.7 MG/DL (ref 8–23)
CALCIUM SERPL-MCNC: 9.5 MG/DL (ref 8.8–10.2)
CHLORIDE SERPL-SCNC: 105 MMOL/L (ref 98–107)
CHOLEST SERPL-MCNC: 188 MG/DL
CREAT SERPL-MCNC: 0.98 MG/DL (ref 0.67–1.17)
DEPRECATED HCO3 PLAS-SCNC: 27 MMOL/L (ref 22–29)
EGFRCR SERPLBLD CKD-EPI 2021: 87 ML/MIN/1.73M2
FASTING STATUS PATIENT QL REPORTED: YES
GLUCOSE SERPL-MCNC: 103 MG/DL (ref 70–99)
HDLC SERPL-MCNC: 56 MG/DL
LDLC SERPL CALC-MCNC: 107 MG/DL
NONHDLC SERPL-MCNC: 132 MG/DL
POTASSIUM SERPL-SCNC: 4.3 MMOL/L (ref 3.4–5.3)
PSA SERPL DL<=0.01 NG/ML-MCNC: 0.54 NG/ML (ref 0–4.5)
SODIUM SERPL-SCNC: 139 MMOL/L (ref 135–145)
TRIGL SERPL-MCNC: 125 MG/DL

## 2024-01-23 PROCEDURE — 80048 BASIC METABOLIC PNL TOTAL CA: CPT

## 2024-01-23 PROCEDURE — 80061 LIPID PANEL: CPT

## 2024-01-23 PROCEDURE — G0103 PSA SCREENING: HCPCS

## 2024-01-23 PROCEDURE — 36415 COLL VENOUS BLD VENIPUNCTURE: CPT

## 2024-01-29 ENCOUNTER — HOSPITAL ENCOUNTER (OUTPATIENT)
Dept: CARDIOLOGY | Facility: CLINIC | Age: 64
Discharge: HOME | End: 2024-01-29
Attending: PHYSICIAN ASSISTANT | Admitting: PHYSICIAN ASSISTANT
Payer: COMMERCIAL

## 2024-01-29 DIAGNOSIS — R93.89 ABNORMAL CHEST CT: ICD-10-CM

## 2024-01-29 DIAGNOSIS — R07.89 ATYPICAL CHEST PAIN: ICD-10-CM

## 2024-01-29 PROCEDURE — 93016 CV STRESS TEST SUPVJ ONLY: CPT | Performed by: INTERNAL MEDICINE

## 2024-01-29 PROCEDURE — 93017 CV STRESS TEST TRACING ONLY: CPT

## 2024-01-29 PROCEDURE — 93018 CV STRESS TEST I&R ONLY: CPT | Performed by: INTERNAL MEDICINE

## 2024-02-05 ENCOUNTER — MYC MEDICAL ADVICE (OUTPATIENT)
Dept: FAMILY MEDICINE | Facility: CLINIC | Age: 64
End: 2024-02-05
Payer: COMMERCIAL

## 2024-02-05 DIAGNOSIS — R07.89 ATYPICAL CHEST PAIN: Primary | ICD-10-CM

## 2024-02-14 ENCOUNTER — LAB (OUTPATIENT)
Dept: LAB | Facility: CLINIC | Age: 64
End: 2024-02-14
Payer: COMMERCIAL

## 2024-02-14 DIAGNOSIS — Z12.11 SCREEN FOR COLON CANCER: ICD-10-CM

## 2024-02-14 LAB — HEMOCCULT STL QL IA: NEGATIVE

## 2024-02-14 PROCEDURE — 82274 ASSAY TEST FOR BLOOD FECAL: CPT

## 2024-02-15 ENCOUNTER — OFFICE VISIT (OUTPATIENT)
Dept: CARDIOLOGY | Facility: CLINIC | Age: 64
End: 2024-02-15
Payer: COMMERCIAL

## 2024-02-15 ENCOUNTER — DOCUMENTATION ONLY (OUTPATIENT)
Dept: CARDIOLOGY | Facility: CLINIC | Age: 64
End: 2024-02-15

## 2024-02-15 VITALS
WEIGHT: 185.1 LBS | RESPIRATION RATE: 16 BRPM | BODY MASS INDEX: 28.05 KG/M2 | DIASTOLIC BLOOD PRESSURE: 60 MMHG | OXYGEN SATURATION: 95 % | HEART RATE: 73 BPM | SYSTOLIC BLOOD PRESSURE: 122 MMHG | HEIGHT: 68 IN

## 2024-02-15 DIAGNOSIS — Z82.49 FAMILY HISTORY OF MI (MYOCARDIAL INFARCTION): ICD-10-CM

## 2024-02-15 DIAGNOSIS — R07.89 ATYPICAL CHEST PAIN: Primary | ICD-10-CM

## 2024-02-15 DIAGNOSIS — R07.89 CHEST PRESSURE: Primary | ICD-10-CM

## 2024-02-15 DIAGNOSIS — R07.89 ATYPICAL CHEST PAIN: ICD-10-CM

## 2024-02-15 DIAGNOSIS — R93.89 ABNORMAL CHEST CT: ICD-10-CM

## 2024-02-15 DIAGNOSIS — M15.9 OSTEOARTHRITIS OF MULTIPLE JOINTS, UNSPECIFIED OSTEOARTHRITIS TYPE: ICD-10-CM

## 2024-02-15 LAB
ERYTHROCYTE [SEDIMENTATION RATE] IN BLOOD BY WESTERGREN METHOD: 3 MM/HR (ref 0–20)
URATE SERPL-MCNC: 5.2 MG/DL (ref 3.4–7)

## 2024-02-15 PROCEDURE — 85652 RBC SED RATE AUTOMATED: CPT | Performed by: INTERNAL MEDICINE

## 2024-02-15 PROCEDURE — 86038 ANTINUCLEAR ANTIBODIES: CPT | Performed by: INTERNAL MEDICINE

## 2024-02-15 PROCEDURE — 36415 COLL VENOUS BLD VENIPUNCTURE: CPT | Performed by: INTERNAL MEDICINE

## 2024-02-15 PROCEDURE — 86141 C-REACTIVE PROTEIN HS: CPT | Performed by: INTERNAL MEDICINE

## 2024-02-15 PROCEDURE — 99204 OFFICE O/P NEW MOD 45 MIN: CPT | Performed by: INTERNAL MEDICINE

## 2024-02-15 PROCEDURE — 84550 ASSAY OF BLOOD/URIC ACID: CPT | Performed by: INTERNAL MEDICINE

## 2024-02-15 NOTE — PATIENT INSTRUCTIONS
Draw blood work to screen for inflammation     CTA coronary angiogram to look for atherosclerosis(blockages) whether mild or severe    Consider ibuprofen 600mg twice a day for 1 week then lower 400mg twice a day for 2 weeks, then 200mg twice a day for 2 weeks then stop    Use omeprazole 20mg daily while on ibuprofen to prevent stomach problems (take 30-60 min before breakfast or dinner)    Consider Berberine 500mg daily to lower cholesterol otherwise consider rosuvastatin 5-10mg daily
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
## 2024-02-15 NOTE — PROGRESS NOTES
Order placed  -sea    =========  ----- Message -----  From: Praveen Mckeon MD  Sent: 2/15/2024   3:46 PM CST  To: Rosalva Patterson; Shelia Delacruz RN  Subject: RE: PANETTA - CTA HEART                          Without calcium score  ----- Message -----  From: Rosalva Patterson  Sent: 2/15/2024   3:39 PM CST  To: Shelia Delacruz RN; Praveen Mckeon MD  Subject: RE: PANETTA - CTA HEART                          We can do a Coronary CTA (with or without calcium score) in the [place] region.   ----- Message -----  From: Praveen Mckeon MD  Sent: 2/15/2024   3:38 PM CST  To: Rosalva Patterson; Shelia Delacurz RN  Subject: RE: PANETTA - CTA HEART                          Coronary CTA - which ever is done in the [facility] regin  Thanks!!  ----- Message -----  From: Shelia Delacruz RN  Sent: 2/15/2024   3:33 PM CST  To: Rosalva Patterson; Praveen Mckeon MD  Subject: FW: PANETTA - CTA HEART                          Dr Mckeon, Please see schedulers comments on your ordered imaging. Please clarify if you are wanting coronary CTA vs CTA heart which isn't done in the [place].   ----- Message -----  From: Rosalva Patterson  Sent: 2/15/2024   2:46 PM CST  To: Shelia Delacruz RN  Subject: RE: PANETTA - CTA HEART                          That would be helpful! Sorry!!  ----- Message -----  From: Shelia Delacruz RN  Sent: 2/15/2024   2:37 PM CST  To: Rosalva Patterson  Subject: RE: PANETTA - CTA HEART                          I'm not able to see the patient name    ----- Message -----  From: Rosalva Patterson  Sent: 2/15/2024   2:26 PM CST  To: Shelia Delacruz RN  Subject: PANETTA - CTA HEART                              Dr. Mike Ledbetter ordered a CTA Heart for this pt. They do not do these at [facility] or [facility] and I do not schedule them. It looks like it is only showing up on our workque, I am afraid no one will reach out to the pt to schedule this. Do you want me to call him and give him the number to the [facility]? Thanks!
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
## 2024-02-16 LAB
ANA SER QL IF: NEGATIVE
CRP SERPL HS-MCNC: 1.55 MG/L

## 2024-02-27 ENCOUNTER — HOSPITAL ENCOUNTER (OUTPATIENT)
Dept: CT IMAGING | Facility: CLINIC | Age: 64
Discharge: HOME | End: 2024-02-27
Attending: INTERNAL MEDICINE | Admitting: INTERNAL MEDICINE
Payer: COMMERCIAL

## 2024-02-27 VITALS — RESPIRATION RATE: 14 BRPM | HEART RATE: 53 BPM | DIASTOLIC BLOOD PRESSURE: 97 MMHG | SYSTOLIC BLOOD PRESSURE: 117 MMHG

## 2024-02-27 DIAGNOSIS — Z82.49 FAMILY HISTORY OF MI (MYOCARDIAL INFARCTION): ICD-10-CM

## 2024-02-27 DIAGNOSIS — R07.89 ATYPICAL CHEST PAIN: ICD-10-CM

## 2024-02-27 PROCEDURE — 250N000013 HC RX MED GY IP 250 OP 250 PS 637: Performed by: INTERNAL MEDICINE

## 2024-02-27 PROCEDURE — 75574 CT ANGIO HRT W/3D IMAGE: CPT | Mod: 26 | Performed by: INTERNAL MEDICINE

## 2024-02-27 PROCEDURE — 75574 CT ANGIO HRT W/3D IMAGE: CPT

## 2024-02-27 PROCEDURE — 250N000011 HC RX IP 250 OP 636: Performed by: INTERNAL MEDICINE

## 2024-02-27 RX ORDER — IOPAMIDOL 755 MG/ML
110 INJECTION, SOLUTION INTRAVASCULAR ONCE
Status: COMPLETED | OUTPATIENT
Start: 2024-02-27 | End: 2024-02-27

## 2024-02-27 RX ORDER — NITROGLYCERIN 0.4 MG/1
.4-.8 TABLET SUBLINGUAL
Status: DISCONTINUED | OUTPATIENT
Start: 2024-02-27 | End: 2024-02-28 | Stop reason: HOSPADM

## 2024-02-27 RX ORDER — ONDANSETRON 2 MG/ML
4 INJECTION INTRAMUSCULAR; INTRAVENOUS
Status: DISCONTINUED | OUTPATIENT
Start: 2024-02-27 | End: 2024-02-28 | Stop reason: HOSPADM

## 2024-02-27 RX ORDER — METOPROLOL TARTRATE 25 MG/1
25-100 TABLET, FILM COATED ORAL
Status: COMPLETED | OUTPATIENT
Start: 2024-02-27 | End: 2024-02-27

## 2024-02-27 RX ORDER — METOPROLOL TARTRATE 1 MG/ML
5-15 INJECTION, SOLUTION INTRAVENOUS
Status: DISCONTINUED | OUTPATIENT
Start: 2024-02-27 | End: 2024-02-28 | Stop reason: HOSPADM

## 2024-02-27 RX ORDER — DIPHENHYDRAMINE HCL 25 MG
25 CAPSULE ORAL
Status: COMPLETED | OUTPATIENT
Start: 2024-02-27 | End: 2024-02-27

## 2024-02-27 RX ORDER — DILTIAZEM HYDROCHLORIDE 5 MG/ML
10-15 INJECTION INTRAVENOUS
Status: DISCONTINUED | OUTPATIENT
Start: 2024-02-27 | End: 2024-02-28 | Stop reason: HOSPADM

## 2024-02-27 RX ORDER — IVABRADINE 5 MG/1
5-15 TABLET, FILM COATED ORAL
Status: DISCONTINUED | OUTPATIENT
Start: 2024-02-27 | End: 2024-02-28 | Stop reason: HOSPADM

## 2024-02-27 RX ORDER — ACYCLOVIR 200 MG/1
0-1 CAPSULE ORAL
Status: DISCONTINUED | OUTPATIENT
Start: 2024-02-27 | End: 2024-02-28 | Stop reason: HOSPADM

## 2024-02-27 RX ORDER — DILTIAZEM HYDROCHLORIDE 120 MG/1
120 TABLET, FILM COATED ORAL
Status: DISCONTINUED | OUTPATIENT
Start: 2024-02-27 | End: 2024-02-28 | Stop reason: HOSPADM

## 2024-02-27 RX ORDER — METHYLPREDNISOLONE SODIUM SUCCINATE 125 MG/2ML
125 INJECTION, POWDER, LYOPHILIZED, FOR SOLUTION INTRAMUSCULAR; INTRAVENOUS
Status: DISCONTINUED | OUTPATIENT
Start: 2024-02-27 | End: 2024-02-28 | Stop reason: HOSPADM

## 2024-02-27 RX ORDER — DIPHENHYDRAMINE HYDROCHLORIDE 50 MG/ML
25-50 INJECTION INTRAMUSCULAR; INTRAVENOUS
Status: DISCONTINUED | OUTPATIENT
Start: 2024-02-27 | End: 2024-02-28 | Stop reason: HOSPADM

## 2024-02-27 RX ADMIN — DIPHENHYDRAMINE HYDROCHLORIDE 50 MG: 50 CAPSULE ORAL at 09:44

## 2024-02-27 RX ADMIN — NITROGLYCERIN 0.8 MG: 0.4 TABLET SUBLINGUAL at 09:15

## 2024-02-27 RX ADMIN — IOPAMIDOL 110 ML: 755 INJECTION, SOLUTION INTRAVENOUS at 09:13

## 2024-02-27 RX ADMIN — METOPROLOL TARTRATE 50 MG: 50 TABLET, FILM COATED ORAL at 08:15

## 2024-02-27 NOTE — PROGRESS NOTES
Pt arrived for Coronary CT angiogram. Test, meds and side effects reviewed with pt. Resting HR 60-65 bpm. Given 50 mg PO Metoprolol per verbal order. Administered 0.8 mg SL nitro on CTA table per order. CTA completed. Patient developed hives over his chest and upper back post contrast injection.  Patient denies SOB, difficulty breathing or swelling of his face.  Patient was given Benadryl 50mg PO and attending cardiologist was notified.  Patient and wife were instructed to call 911 if he develops difficulty breathing.  Post monitoring completed and VSS. D/C instructions reviewed with pt whom verbalized understanding of need to increase PO fluids today. D/C to [facility] waiting room accompanied by staff.

## 2024-02-28 RX ORDER — ROSUVASTATIN CALCIUM 10 MG/1
10 TABLET, COATED ORAL DAILY
Qty: 90 TABLET | Refills: 3 | Status: SHIPPED | OUTPATIENT
Start: 2024-02-28

## 2024-09-09 ENCOUNTER — MYC MEDICAL ADVICE (OUTPATIENT)
Dept: FAMILY MEDICINE | Facility: CLINIC | Age: 64
End: 2024-09-09
Payer: COMMERCIAL

## 2024-09-09 NOTE — TELEPHONE ENCOUNTER
Routing to nurses to triage symptoms.     Renita Burden  Lead   [facility]

## 2024-10-04 ENCOUNTER — IMMUNIZATION (OUTPATIENT)
Dept: FAMILY MEDICINE | Facility: CLINIC | Age: 64
End: 2024-10-04
Payer: COMMERCIAL

## 2024-10-04 DIAGNOSIS — Z23 ENCOUNTER FOR IMMUNIZATION: Primary | ICD-10-CM

## 2024-10-04 PROBLEM — S82.434A CLOSED NONDISPLACED OBLIQUE FRACTURE OF SHAFT OF RIGHT FIBULA: Status: ACTIVE | Noted: 2018-06-18

## 2024-10-04 PROBLEM — I83.811 VARICOSE VEINS OF RIGHT LOWER EXTREMITY WITH PAIN: Status: RESOLVED | Noted: 2017-09-15 | Resolved: 2024-10-04

## 2024-10-04 PROCEDURE — 90673 RIV3 VACCINE NO PRESERV IM: CPT

## 2024-10-04 PROCEDURE — 99207 PR NO CHARGE NURSE ONLY: CPT

## 2024-10-04 PROCEDURE — 90471 IMMUNIZATION ADMIN: CPT

## 2024-11-08 ENCOUNTER — TRANSFERRED RECORDS (OUTPATIENT)
Dept: HEALTH INFORMATION MANAGEMENT | Facility: CLINIC | Age: 64
End: 2024-11-08

## 2024-12-19 ENCOUNTER — PATIENT OUTREACH (OUTPATIENT)
Dept: CARE COORDINATION | Facility: CLINIC | Age: 64
End: 2024-12-19
Payer: COMMERCIAL

## 2025-01-02 ENCOUNTER — PATIENT OUTREACH (OUTPATIENT)
Dept: CARE COORDINATION | Facility: CLINIC | Age: 65
End: 2025-01-02
Payer: COMMERCIAL

## 2025-01-15 ENCOUNTER — PATIENT OUTREACH (OUTPATIENT)
Dept: CARE COORDINATION | Facility: CLINIC | Age: 65
End: 2025-01-15
Payer: COMMERCIAL

## 2025-01-16 DIAGNOSIS — Z12.11 COLON CANCER SCREENING: ICD-10-CM

## 2025-01-29 ENCOUNTER — ORDERS ONLY (AUTO-RELEASED) (OUTPATIENT)
Dept: ADMISSION | Facility: CLINIC | Age: 65
End: 2025-01-29
Payer: COMMERCIAL

## 2025-01-29 DIAGNOSIS — Z12.11 COLON CANCER SCREENING: ICD-10-CM

## 2025-02-28 ENCOUNTER — HOSPITAL ENCOUNTER (OUTPATIENT)
Dept: CT IMAGING | Facility: CLINIC | Age: 65
Discharge: HOME | End: 2025-02-28
Attending: INTERNAL MEDICINE | Admitting: INTERNAL MEDICINE
Payer: COMMERCIAL

## 2025-02-28 DIAGNOSIS — R93.89 ABNORMAL CHEST CT: ICD-10-CM

## 2025-02-28 DIAGNOSIS — Z82.49 FAMILY HISTORY OF MI (MYOCARDIAL INFARCTION): ICD-10-CM

## 2025-02-28 DIAGNOSIS — R07.89 ATYPICAL CHEST PAIN: ICD-10-CM

## 2025-02-28 PROCEDURE — 82274 ASSAY TEST FOR BLOOD FECAL: CPT | Performed by: PHYSICIAN ASSISTANT

## 2025-02-28 PROCEDURE — 71250 CT THORAX DX C-: CPT

## 2025-03-04 LAB — HEMOCCULT STL QL IA: NEGATIVE

## (undated) RX ORDER — DIPHENHYDRAMINE HCL 50 MG
CAPSULE ORAL
Status: DISPENSED
Start: 2024-02-27

## (undated) RX ORDER — NITROGLYCERIN 0.4 MG/1
TABLET SUBLINGUAL
Status: DISPENSED
Start: 2024-02-27

## (undated) RX ORDER — METOPROLOL TARTRATE 50 MG
TABLET ORAL
Status: DISPENSED
Start: 2024-02-27